# Patient Record
Sex: FEMALE | Race: WHITE | NOT HISPANIC OR LATINO | URBAN - METROPOLITAN AREA
[De-identification: names, ages, dates, MRNs, and addresses within clinical notes are randomized per-mention and may not be internally consistent; named-entity substitution may affect disease eponyms.]

---

## 2017-01-01 DIAGNOSIS — Z90.2 ACQUIRED ABSENCE OF LUNG [PART OF]: ICD-10-CM

## 2017-01-01 DIAGNOSIS — Z79.01 LONG TERM (CURRENT) USE OF ANTICOAGULANTS: ICD-10-CM

## 2017-01-01 DIAGNOSIS — N39.0 URINARY TRACT INFECTION, SITE NOT SPECIFIED: ICD-10-CM

## 2017-01-01 DIAGNOSIS — Z85.118 PERSONAL HISTORY OF OTHER MALIGNANT NEOPLASM OF BRONCHUS AND LUNG: ICD-10-CM

## 2017-01-01 DIAGNOSIS — Z87.891 PERSONAL HISTORY OF NICOTINE DEPENDENCE: ICD-10-CM

## 2017-01-01 DIAGNOSIS — Z99.81 DEPENDENCE ON SUPPLEMENTAL OXYGEN: ICD-10-CM

## 2017-01-01 DIAGNOSIS — J44.1 CHRONIC OBSTRUCTIVE PULMONARY DISEASE WITH (ACUTE) EXACERBATION: ICD-10-CM

## 2017-01-01 DIAGNOSIS — E03.9 HYPOTHYROIDISM, UNSPECIFIED: ICD-10-CM

## 2017-01-01 DIAGNOSIS — R06.02 SHORTNESS OF BREATH: ICD-10-CM

## 2017-01-01 DIAGNOSIS — J44.0 CHRONIC OBSTRUCTIVE PULMONARY DISEASE WITH (ACUTE) LOWER RESPIRATORY INFECTION: ICD-10-CM

## 2017-01-01 DIAGNOSIS — J96.22 ACUTE AND CHRONIC RESPIRATORY FAILURE WITH HYPERCAPNIA: ICD-10-CM

## 2017-01-01 DIAGNOSIS — J20.9 ACUTE BRONCHITIS, UNSPECIFIED: ICD-10-CM

## 2017-01-01 DIAGNOSIS — Z96.0 PRESENCE OF UROGENITAL IMPLANTS: ICD-10-CM
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
## 2017-01-01 DIAGNOSIS — J96.21 ACUTE AND CHRONIC RESPIRATORY FAILURE WITH HYPOXIA: ICD-10-CM

## 2017-01-01 DIAGNOSIS — Z86.718 PERSONAL HISTORY OF OTHER VENOUS THROMBOSIS AND EMBOLISM: ICD-10-CM

## 2017-01-01 DIAGNOSIS — T38.0X5A ADVERSE EFFECT OF GLUCOCORTICOIDS AND SYNTHETIC ANALOGUES, INITIAL ENCOUNTER: ICD-10-CM

## 2017-01-01 DIAGNOSIS — E09.9 DRUG OR CHEMICAL INDUCED DIABETES MELLITUS WITHOUT COMPLICATIONS: ICD-10-CM

## 2017-04-19 ENCOUNTER — INPATIENT (INPATIENT)
Facility: HOSPITAL | Age: 77
LOS: 13 days | Discharge: HOME | End: 2017-05-03
Attending: INTERNAL MEDICINE

## 2017-06-17 ENCOUNTER — OUTPATIENT (OUTPATIENT)
Dept: OUTPATIENT SERVICES | Facility: HOSPITAL | Age: 77
LOS: 1 days | Discharge: HOME | End: 2017-06-17

## 2017-06-17 DIAGNOSIS — J44.1 CHRONIC OBSTRUCTIVE PULMONARY DISEASE WITH (ACUTE) EXACERBATION: ICD-10-CM

## 2017-06-28 DIAGNOSIS — N39.0 URINARY TRACT INFECTION, SITE NOT SPECIFIED: ICD-10-CM

## 2018-01-01 ENCOUNTER — INPATIENT (INPATIENT)
Facility: HOSPITAL | Age: 78
LOS: 0 days | End: 2018-07-28
Attending: INTERNAL MEDICINE | Admitting: INTERNAL MEDICINE

## 2018-01-01 ENCOUNTER — INPATIENT (INPATIENT)
Facility: HOSPITAL | Age: 78
LOS: 0 days | Discharge: HOME HEALTH | End: 2018-01-19
Attending: INTERNAL MEDICINE | Admitting: INTERNAL MEDICINE

## 2018-01-01 ENCOUNTER — INPATIENT (INPATIENT)
Facility: HOSPITAL | Age: 78
LOS: 5 days | Discharge: HOME HEALTH | End: 2018-05-23
Attending: HOSPITALIST | Admitting: HOSPITALIST

## 2018-01-01 ENCOUNTER — INPATIENT (INPATIENT)
Facility: HOSPITAL | Age: 78
LOS: 2 days | Discharge: HOME HEALTH | End: 2018-06-14
Attending: INTERNAL MEDICINE | Admitting: INTERNAL MEDICINE

## 2018-01-01 ENCOUNTER — EMERGENCY (EMERGENCY)
Facility: HOSPITAL | Age: 78
LOS: 1 days | End: 2018-01-01
Attending: EMERGENCY MEDICINE

## 2018-01-01 VITALS
HEART RATE: 104 BPM | DIASTOLIC BLOOD PRESSURE: 67 MMHG | OXYGEN SATURATION: 84 % | RESPIRATION RATE: 24 BRPM | SYSTOLIC BLOOD PRESSURE: 144 MMHG

## 2018-01-01 VITALS
RESPIRATION RATE: 18 BRPM | DIASTOLIC BLOOD PRESSURE: 55 MMHG | TEMPERATURE: 97 F | OXYGEN SATURATION: 98 % | HEART RATE: 93 BPM | SYSTOLIC BLOOD PRESSURE: 94 MMHG

## 2018-01-01 VITALS
SYSTOLIC BLOOD PRESSURE: 119 MMHG | TEMPERATURE: 97 F | HEART RATE: 74 BPM | DIASTOLIC BLOOD PRESSURE: 57 MMHG | OXYGEN SATURATION: 97 % | RESPIRATION RATE: 20 BRPM

## 2018-01-01 VITALS — OXYGEN SATURATION: 100 % | HEART RATE: 99 BPM

## 2018-01-01 VITALS
HEART RATE: 100 BPM | DIASTOLIC BLOOD PRESSURE: 59 MMHG | RESPIRATION RATE: 18 BRPM | SYSTOLIC BLOOD PRESSURE: 98 MMHG | TEMPERATURE: 96 F

## 2018-01-01 VITALS
DIASTOLIC BLOOD PRESSURE: 57 MMHG | HEART RATE: 78 BPM | RESPIRATION RATE: 18 BRPM | SYSTOLIC BLOOD PRESSURE: 119 MMHG | TEMPERATURE: 97 F

## 2018-01-01 VITALS — OXYGEN SATURATION: 79 % | HEART RATE: 87 BPM

## 2018-01-01 DIAGNOSIS — I46.9 CARDIAC ARREST, CAUSE UNSPECIFIED: ICD-10-CM

## 2018-01-01 DIAGNOSIS — Z99.81 DEPENDENCE ON SUPPLEMENTAL OXYGEN: ICD-10-CM

## 2018-01-01 DIAGNOSIS — Z91.14 PATIENT'S OTHER NONCOMPLIANCE WITH MEDICATION REGIMEN: ICD-10-CM

## 2018-01-01 DIAGNOSIS — G47.33 OBSTRUCTIVE SLEEP APNEA (ADULT) (PEDIATRIC): ICD-10-CM

## 2018-01-01 DIAGNOSIS — E03.9 HYPOTHYROIDISM, UNSPECIFIED: ICD-10-CM

## 2018-01-01 DIAGNOSIS — Z85.118 PERSONAL HISTORY OF OTHER MALIGNANT NEOPLASM OF BRONCHUS AND LUNG: ICD-10-CM

## 2018-01-01 DIAGNOSIS — Z98.890 OTHER SPECIFIED POSTPROCEDURAL STATES: Chronic | ICD-10-CM

## 2018-01-01 DIAGNOSIS — J98.11 ATELECTASIS: ICD-10-CM

## 2018-01-01 DIAGNOSIS — J96.22 ACUTE AND CHRONIC RESPIRATORY FAILURE WITH HYPERCAPNIA: ICD-10-CM

## 2018-01-01 DIAGNOSIS — Z79.01 LONG TERM (CURRENT) USE OF ANTICOAGULANTS: ICD-10-CM

## 2018-01-01 DIAGNOSIS — J44.1 CHRONIC OBSTRUCTIVE PULMONARY DISEASE WITH (ACUTE) EXACERBATION: ICD-10-CM

## 2018-01-01 DIAGNOSIS — Z66 DO NOT RESUSCITATE: ICD-10-CM

## 2018-01-01 DIAGNOSIS — R33.9 RETENTION OF URINE, UNSPECIFIED: ICD-10-CM

## 2018-01-01 DIAGNOSIS — F17.210 NICOTINE DEPENDENCE, CIGARETTES, UNCOMPLICATED: ICD-10-CM

## 2018-01-01 DIAGNOSIS — D64.9 ANEMIA, UNSPECIFIED: ICD-10-CM

## 2018-01-01 DIAGNOSIS — E87.1 HYPO-OSMOLALITY AND HYPONATREMIA: ICD-10-CM

## 2018-01-01 DIAGNOSIS — Z51.5 ENCOUNTER FOR PALLIATIVE CARE: ICD-10-CM

## 2018-01-01 DIAGNOSIS — Z86.718 PERSONAL HISTORY OF OTHER VENOUS THROMBOSIS AND EMBOLISM: ICD-10-CM

## 2018-01-01 DIAGNOSIS — N30.00 ACUTE CYSTITIS WITHOUT HEMATURIA: ICD-10-CM

## 2018-01-01 DIAGNOSIS — Z90.2 ACQUIRED ABSENCE OF LUNG [PART OF]: Chronic | ICD-10-CM

## 2018-01-01 DIAGNOSIS — Z87.891 PERSONAL HISTORY OF NICOTINE DEPENDENCE: ICD-10-CM

## 2018-01-01 DIAGNOSIS — J44.9 CHRONIC OBSTRUCTIVE PULMONARY DISEASE, UNSPECIFIED: ICD-10-CM

## 2018-01-01 DIAGNOSIS — T83.511A INFECTION AND INFLAMMATORY REACTION DUE TO INDWELLING URETHRAL CATHETER, INITIAL ENCOUNTER: ICD-10-CM

## 2018-01-01 DIAGNOSIS — Z88.1 ALLERGY STATUS TO OTHER ANTIBIOTIC AGENTS STATUS: ICD-10-CM

## 2018-01-01 DIAGNOSIS — R73.9 HYPERGLYCEMIA, UNSPECIFIED: ICD-10-CM

## 2018-01-01 DIAGNOSIS — Z90.2 ACQUIRED ABSENCE OF LUNG [PART OF]: ICD-10-CM

## 2018-01-01 DIAGNOSIS — J96.21 ACUTE AND CHRONIC RESPIRATORY FAILURE WITH HYPOXIA: ICD-10-CM

## 2018-01-01 DIAGNOSIS — R06.02 SHORTNESS OF BREATH: ICD-10-CM

## 2018-01-01 DIAGNOSIS — C34.90 MALIGNANT NEOPLASM OF UNSPECIFIED PART OF UNSPECIFIED BRONCHUS OR LUNG: ICD-10-CM

## 2018-01-01 DIAGNOSIS — Z99.3 DEPENDENCE ON WHEELCHAIR: ICD-10-CM

## 2018-01-01 DIAGNOSIS — Z79.4 LONG TERM (CURRENT) USE OF INSULIN: ICD-10-CM

## 2018-01-01 DIAGNOSIS — E61.1 IRON DEFICIENCY: ICD-10-CM
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
## 2018-01-01 DIAGNOSIS — E03.8 OTHER SPECIFIED HYPOTHYROIDISM: ICD-10-CM

## 2018-01-01 LAB
-  AMIKACIN: SIGNIFICANT CHANGE UP
-  AZTREONAM: SIGNIFICANT CHANGE UP
-  CEFEPIME: SIGNIFICANT CHANGE UP
-  CEFTAZIDIME: SIGNIFICANT CHANGE UP
-  CIPROFLOXACIN: SIGNIFICANT CHANGE UP
-  GENTAMICIN: SIGNIFICANT CHANGE UP
-  IMIPENEM: SIGNIFICANT CHANGE UP
-  LEVOFLOXACIN: SIGNIFICANT CHANGE UP
-  MEROPENEM: SIGNIFICANT CHANGE UP
-  PIPERACILLIN/TAZOBACTAM: SIGNIFICANT CHANGE UP
-  TOBRAMYCIN: SIGNIFICANT CHANGE UP
ALBUMIN SERPL ELPH-MCNC: 3.6 G/DL — SIGNIFICANT CHANGE UP (ref 3.5–5.2)
ALBUMIN SERPL ELPH-MCNC: 3.7 G/DL — SIGNIFICANT CHANGE UP (ref 3.5–5.2)
ALBUMIN SERPL ELPH-MCNC: 3.8 G/DL — SIGNIFICANT CHANGE UP (ref 3.5–5.2)
ALBUMIN SERPL ELPH-MCNC: 3.8 G/DL — SIGNIFICANT CHANGE UP (ref 3.5–5.2)
ALP SERPL-CCNC: 113 U/L — SIGNIFICANT CHANGE UP (ref 30–115)
ALP SERPL-CCNC: 120 U/L — HIGH (ref 30–115)
ALP SERPL-CCNC: 126 U/L — HIGH (ref 30–115)
ALP SERPL-CCNC: 191 U/L — HIGH (ref 30–115)
ALT FLD-CCNC: 12 U/L — SIGNIFICANT CHANGE UP (ref 0–41)
ALT FLD-CCNC: 15 U/L — SIGNIFICANT CHANGE UP (ref 0–41)
ALT FLD-CCNC: 20 U/L — SIGNIFICANT CHANGE UP (ref 0–41)
ALT FLD-CCNC: 20 U/L — SIGNIFICANT CHANGE UP (ref 0–41)
ANION GAP SERPL CALC-SCNC: 13 MMOL/L — SIGNIFICANT CHANGE UP (ref 7–14)
ANION GAP SERPL CALC-SCNC: 14 MMOL/L — SIGNIFICANT CHANGE UP (ref 7–14)
ANION GAP SERPL CALC-SCNC: 14 MMOL/L — SIGNIFICANT CHANGE UP (ref 7–14)
ANION GAP SERPL CALC-SCNC: 17 MMOL/L — HIGH (ref 7–14)
ANION GAP SERPL CALC-SCNC: 5 MMOL/L — LOW (ref 7–14)
ANION GAP SERPL CALC-SCNC: 6 MMOL/L — LOW (ref 7–14)
ANION GAP SERPL CALC-SCNC: 6 MMOL/L — LOW (ref 7–14)
ANION GAP SERPL CALC-SCNC: 8 MMOL/L — SIGNIFICANT CHANGE UP (ref 7–14)
ANION GAP SERPL CALC-SCNC: 9 MMOL/L — SIGNIFICANT CHANGE UP (ref 7–14)
APPEARANCE UR: (no result)
APPEARANCE UR: CLEAR — SIGNIFICANT CHANGE UP
APPEARANCE UR: CLEAR — SIGNIFICANT CHANGE UP
APTT BLD: 32.8 SEC — SIGNIFICANT CHANGE UP (ref 27–39.2)
APTT BLD: 33.2 SEC — SIGNIFICANT CHANGE UP (ref 27–39.2)
APTT BLD: 49.4 SEC — HIGH (ref 27–39.2)
AST SERPL-CCNC: 14 U/L — SIGNIFICANT CHANGE UP (ref 0–41)
AST SERPL-CCNC: 18 U/L — SIGNIFICANT CHANGE UP (ref 0–41)
AST SERPL-CCNC: 19 U/L — SIGNIFICANT CHANGE UP (ref 0–41)
AST SERPL-CCNC: 20 U/L — SIGNIFICANT CHANGE UP (ref 0–41)
BACTERIA # UR AUTO: (no result) /HPF
BASE EXCESS BLDA CALC-SCNC: 22.4 MMOL/L — HIGH (ref -2–2)
BASE EXCESS BLDCOV CALC-SCNC: 21.2 MMOL/L — HIGH (ref -5.3–0.5)
BASE EXCESS BLDV CALC-SCNC: 23.7 MMOL/L — HIGH (ref -2–2)
BASOPHILS # BLD AUTO: 0 K/UL — SIGNIFICANT CHANGE UP (ref 0–0.2)
BASOPHILS # BLD AUTO: 0.01 K/UL — SIGNIFICANT CHANGE UP (ref 0–0.2)
BASOPHILS # BLD AUTO: 0.03 K/UL — SIGNIFICANT CHANGE UP (ref 0–0.2)
BASOPHILS # BLD AUTO: 0.04 K/UL — SIGNIFICANT CHANGE UP (ref 0–0.2)
BASOPHILS NFR BLD AUTO: 0 % — SIGNIFICANT CHANGE UP (ref 0–1)
BASOPHILS NFR BLD AUTO: 0.1 % — SIGNIFICANT CHANGE UP (ref 0–1)
BASOPHILS NFR BLD AUTO: 0.3 % — SIGNIFICANT CHANGE UP (ref 0–1)
BASOPHILS NFR BLD AUTO: 0.4 % — SIGNIFICANT CHANGE UP (ref 0–1)
BILIRUB DIRECT SERPL-MCNC: <0.2 MG/DL — SIGNIFICANT CHANGE UP (ref 0–0.2)
BILIRUB INDIRECT FLD-MCNC: SIGNIFICANT CHANGE UP MG/DL (ref 0.2–1.2)
BILIRUB SERPL-MCNC: 0.3 MG/DL — SIGNIFICANT CHANGE UP (ref 0.2–1.2)
BILIRUB SERPL-MCNC: <0.2 MG/DL — SIGNIFICANT CHANGE UP (ref 0.2–1.2)
BILIRUB UR-MCNC: NEGATIVE — SIGNIFICANT CHANGE UP
BUN SERPL-MCNC: 13 MG/DL — SIGNIFICANT CHANGE UP (ref 10–20)
BUN SERPL-MCNC: 22 MG/DL — HIGH (ref 10–20)
BUN SERPL-MCNC: 22 MG/DL — HIGH (ref 10–20)
BUN SERPL-MCNC: 23 MG/DL — HIGH (ref 10–20)
BUN SERPL-MCNC: 25 MG/DL — HIGH (ref 10–20)
BUN SERPL-MCNC: 29 MG/DL — HIGH (ref 10–20)
CALCIUM SERPL-MCNC: 8.3 MG/DL — LOW (ref 8.5–10.1)
CALCIUM SERPL-MCNC: 8.4 MG/DL — LOW (ref 8.5–10.1)
CALCIUM SERPL-MCNC: 8.5 MG/DL — SIGNIFICANT CHANGE UP (ref 8.5–10.1)
CALCIUM SERPL-MCNC: 8.6 MG/DL — SIGNIFICANT CHANGE UP (ref 8.5–10.1)
CALCIUM SERPL-MCNC: 8.7 MG/DL — SIGNIFICANT CHANGE UP (ref 8.5–10.1)
CALCIUM SERPL-MCNC: 8.7 MG/DL — SIGNIFICANT CHANGE UP (ref 8.5–10.1)
CALCIUM SERPL-MCNC: 8.9 MG/DL — SIGNIFICANT CHANGE UP (ref 8.5–10.1)
CHLORIDE SERPL-SCNC: 77 MMOL/L — LOW (ref 98–110)
CHLORIDE SERPL-SCNC: 82 MMOL/L — LOW (ref 98–110)
CHLORIDE SERPL-SCNC: 83 MMOL/L — LOW (ref 98–110)
CHLORIDE SERPL-SCNC: 83 MMOL/L — LOW (ref 98–110)
CHLORIDE SERPL-SCNC: 84 MMOL/L — LOW (ref 98–110)
CHLORIDE SERPL-SCNC: 85 MMOL/L — LOW (ref 98–110)
CHLORIDE SERPL-SCNC: 89 MMOL/L — LOW (ref 98–110)
CHLORIDE SERPL-SCNC: 92 MMOL/L — LOW (ref 98–110)
CHLORIDE SERPL-SCNC: 94 MMOL/L — LOW (ref 98–110)
CK MB CFR SERPL CALC: 1.6 NG/ML — SIGNIFICANT CHANGE UP (ref 0.6–6.3)
CK SERPL-CCNC: 33 U/L — SIGNIFICANT CHANGE UP (ref 0–225)
CO2 SERPL-SCNC: 35 MMOL/L — HIGH (ref 17–32)
CO2 SERPL-SCNC: 36 MMOL/L — HIGH (ref 17–32)
CO2 SERPL-SCNC: 38 MMOL/L — HIGH (ref 17–32)
CO2 SERPL-SCNC: 39 MMOL/L — HIGH (ref 17–32)
CO2 SERPL-SCNC: 39 MMOL/L — HIGH (ref 17–32)
CO2 SERPL-SCNC: 42 MMOL/L — CRITICAL HIGH (ref 17–32)
CO2 SERPL-SCNC: 48 MMOL/L — CRITICAL HIGH (ref 17–32)
CO2 SERPL-SCNC: 49 MMOL/L — CRITICAL HIGH (ref 17–32)
CO2 SERPL-SCNC: 49 MMOL/L — CRITICAL HIGH (ref 17–32)
COLOR SPEC: YELLOW — SIGNIFICANT CHANGE UP
COMMENT - URINE: SIGNIFICANT CHANGE UP
CREAT SERPL-MCNC: 0.8 MG/DL — SIGNIFICANT CHANGE UP (ref 0.7–1.5)
CREAT SERPL-MCNC: 0.8 MG/DL — SIGNIFICANT CHANGE UP (ref 0.7–1.5)
CREAT SERPL-MCNC: 0.9 MG/DL — SIGNIFICANT CHANGE UP (ref 0.7–1.5)
CREAT SERPL-MCNC: 1 MG/DL — SIGNIFICANT CHANGE UP (ref 0.7–1.5)
CREAT SERPL-MCNC: 1 MG/DL — SIGNIFICANT CHANGE UP (ref 0.7–1.5)
CREAT SERPL-MCNC: 1.1 MG/DL — SIGNIFICANT CHANGE UP (ref 0.7–1.5)
CREAT SERPL-MCNC: 1.2 MG/DL — SIGNIFICANT CHANGE UP (ref 0.7–1.5)
CULTURE RESULTS: SIGNIFICANT CHANGE UP
DIFF PNL FLD: NEGATIVE — SIGNIFICANT CHANGE UP
EOSINOPHIL # BLD AUTO: 0 K/UL — SIGNIFICANT CHANGE UP (ref 0–0.7)
EOSINOPHIL # BLD AUTO: 0.01 K/UL — SIGNIFICANT CHANGE UP (ref 0–0.7)
EOSINOPHIL # BLD AUTO: 0.24 K/UL — SIGNIFICANT CHANGE UP (ref 0–0.7)
EOSINOPHIL # BLD AUTO: 0.48 K/UL — SIGNIFICANT CHANGE UP (ref 0–0.7)
EOSINOPHIL NFR BLD AUTO: 0 % — SIGNIFICANT CHANGE UP (ref 0–8)
EOSINOPHIL NFR BLD AUTO: 0.1 % — SIGNIFICANT CHANGE UP (ref 0–8)
EOSINOPHIL NFR BLD AUTO: 2.5 % — SIGNIFICANT CHANGE UP (ref 0–8)
EOSINOPHIL NFR BLD AUTO: 5.2 % — SIGNIFICANT CHANGE UP (ref 0–8)
EPI CELLS # UR: (no result) /HPF
GAS PNL BLDA: SIGNIFICANT CHANGE UP
GAS PNL BLDCOV: 7.26 — SIGNIFICANT CHANGE UP (ref 7.26–7.38)
GLUCOSE SERPL-MCNC: 140 MG/DL — HIGH (ref 70–99)
GLUCOSE SERPL-MCNC: 149 MG/DL — HIGH (ref 70–99)
GLUCOSE SERPL-MCNC: 151 MG/DL — HIGH (ref 70–99)
GLUCOSE SERPL-MCNC: 159 MG/DL — HIGH (ref 70–99)
GLUCOSE SERPL-MCNC: 180 MG/DL — HIGH (ref 70–99)
GLUCOSE SERPL-MCNC: 227 MG/DL — HIGH (ref 70–99)
GLUCOSE SERPL-MCNC: 255 MG/DL — HIGH (ref 70–99)
GLUCOSE SERPL-MCNC: 336 MG/DL — HIGH (ref 70–99)
GLUCOSE SERPL-MCNC: 84 MG/DL — SIGNIFICANT CHANGE UP (ref 70–99)
GLUCOSE UR QL: NEGATIVE MG/DL — SIGNIFICANT CHANGE UP
HCO3 BLDA-SCNC: 52 MMOL/L — CRITICAL HIGH (ref 23–27)
HCO3 BLDCOV-SCNC: 53.6 MMOL/L — HIGH (ref 20.5–24.7)
HCO3 BLDV-SCNC: 54 MMOL/L — HIGH (ref 22–29)
HCT VFR BLD CALC: 29.2 % — LOW (ref 37–47)
HCT VFR BLD CALC: 29.9 % — LOW (ref 37–47)
HCT VFR BLD CALC: 30 % — LOW (ref 37–47)
HCT VFR BLD CALC: 30.2 % — LOW (ref 37–47)
HCT VFR BLD CALC: 32.9 % — LOW (ref 37–47)
HCT VFR BLD CALC: 33.3 % — LOW (ref 37–47)
HCT VFR BLD CALC: 34.5 % — LOW (ref 37–47)
HCT VFR BLD CALC: 35 % — LOW (ref 37–47)
HGB BLD-MCNC: 10.8 G/DL — LOW (ref 12–16)
HGB BLD-MCNC: 10.9 G/DL — LOW (ref 12–16)
HGB BLD-MCNC: 11 G/DL — LOW (ref 12–16)
HGB BLD-MCNC: 11 G/DL — LOW (ref 12–16)
HGB BLD-MCNC: 9.5 G/DL — LOW (ref 12–16)
HGB BLD-MCNC: 9.6 G/DL — LOW (ref 12–16)
HGB BLD-MCNC: 9.8 G/DL — LOW (ref 12–16)
HGB BLD-MCNC: 9.9 G/DL — LOW (ref 12–16)
HOROWITZ INDEX BLDA+IHG-RTO: 40 — SIGNIFICANT CHANGE UP
IMM GRANULOCYTES NFR BLD AUTO: 0.8 % — HIGH (ref 0.1–0.3)
IMM GRANULOCYTES NFR BLD AUTO: 0.9 % — HIGH (ref 0.1–0.3)
IMM GRANULOCYTES NFR BLD AUTO: 1.1 % — HIGH (ref 0.1–0.3)
IMM GRANULOCYTES NFR BLD AUTO: 1.1 % — HIGH (ref 0.1–0.3)
INR BLD: 1.31 RATIO — HIGH (ref 0.65–1.3)
INR BLD: 1.59 RATIO — HIGH (ref 0.65–1.3)
KETONES UR-MCNC: NEGATIVE — SIGNIFICANT CHANGE UP
LACTATE SERPL-SCNC: 1.4 MMOL/L — SIGNIFICANT CHANGE UP (ref 0.5–2.2)
LEUKOCYTE ESTERASE UR-ACNC: (no result)
LYMPHOCYTES # BLD AUTO: 0.3 K/UL — LOW (ref 1.2–3.4)
LYMPHOCYTES # BLD AUTO: 0.72 K/UL — LOW (ref 1.2–3.4)
LYMPHOCYTES # BLD AUTO: 1.08 K/UL — LOW (ref 1.2–3.4)
LYMPHOCYTES # BLD AUTO: 1.35 K/UL — SIGNIFICANT CHANGE UP (ref 1.2–3.4)
LYMPHOCYTES # BLD AUTO: 1.49 K/UL — SIGNIFICANT CHANGE UP (ref 1.2–3.4)
LYMPHOCYTES # BLD AUTO: 1.62 K/UL — SIGNIFICANT CHANGE UP (ref 1.2–3.4)
LYMPHOCYTES # BLD AUTO: 12.1 % — LOW (ref 20.5–51.1)
LYMPHOCYTES # BLD AUTO: 14 % — LOW (ref 20.5–51.1)
LYMPHOCYTES # BLD AUTO: 14.6 % — LOW (ref 20.5–51.1)
LYMPHOCYTES # BLD AUTO: 17.2 % — LOW (ref 20.5–51.1)
LYMPHOCYTES # BLD AUTO: 2.5 % — LOW (ref 20.5–51.1)
LYMPHOCYTES # BLD AUTO: 8.5 % — LOW (ref 20.5–51.1)
MAGNESIUM SERPL-MCNC: 1.4 MG/DL — LOW (ref 1.8–2.4)
MAGNESIUM SERPL-MCNC: 1.5 MG/DL — LOW (ref 1.8–2.4)
MAGNESIUM SERPL-MCNC: 1.5 MG/DL — LOW (ref 1.8–2.4)
MAGNESIUM SERPL-MCNC: 2.1 MG/DL — SIGNIFICANT CHANGE UP (ref 1.8–2.4)
MAGNESIUM SERPL-MCNC: 2.1 MG/DL — SIGNIFICANT CHANGE UP (ref 1.8–2.4)
MAGNESIUM SERPL-MCNC: 2.2 MG/DL — SIGNIFICANT CHANGE UP (ref 1.8–2.4)
MCHC RBC-ENTMCNC: 31.4 G/DL — LOW (ref 32–37)
MCHC RBC-ENTMCNC: 31.5 PG — HIGH (ref 27–31)
MCHC RBC-ENTMCNC: 31.6 PG — HIGH (ref 27–31)
MCHC RBC-ENTMCNC: 31.6 PG — HIGH (ref 27–31)
MCHC RBC-ENTMCNC: 31.7 G/DL — LOW (ref 32–37)
MCHC RBC-ENTMCNC: 31.8 G/DL — LOW (ref 32–37)
MCHC RBC-ENTMCNC: 31.8 PG — HIGH (ref 27–31)
MCHC RBC-ENTMCNC: 31.9 G/DL — LOW (ref 32–37)
MCHC RBC-ENTMCNC: 31.9 PG — HIGH (ref 27–31)
MCHC RBC-ENTMCNC: 31.9 PG — HIGH (ref 27–31)
MCHC RBC-ENTMCNC: 32.2 PG — HIGH (ref 27–31)
MCHC RBC-ENTMCNC: 32.7 G/DL — SIGNIFICANT CHANGE UP (ref 32–37)
MCHC RBC-ENTMCNC: 32.7 PG — HIGH (ref 27–31)
MCHC RBC-ENTMCNC: 32.8 G/DL — SIGNIFICANT CHANGE UP (ref 32–37)
MCHC RBC-ENTMCNC: 33.1 G/DL — SIGNIFICANT CHANGE UP (ref 32–37)
MCHC RBC-ENTMCNC: 33.6 G/DL — SIGNIFICANT CHANGE UP (ref 32–37)
MCV RBC AUTO: 100 FL — HIGH (ref 81–99)
MCV RBC AUTO: 100.6 FL — HIGH (ref 81–99)
MCV RBC AUTO: 95.1 FL — SIGNIFICANT CHANGE UP (ref 81–99)
MCV RBC AUTO: 96.1 FL — SIGNIFICANT CHANGE UP (ref 81–99)
MCV RBC AUTO: 98.5 FL — SIGNIFICANT CHANGE UP (ref 81–99)
MCV RBC AUTO: 99.3 FL — HIGH (ref 81–99)
MCV RBC AUTO: 99.3 FL — HIGH (ref 81–99)
MCV RBC AUTO: 99.7 FL — HIGH (ref 81–99)
METHOD TYPE: SIGNIFICANT CHANGE UP
MONOCYTES # BLD AUTO: 0.29 K/UL — SIGNIFICANT CHANGE UP (ref 0.1–0.6)
MONOCYTES # BLD AUTO: 0.56 K/UL — SIGNIFICANT CHANGE UP (ref 0.1–0.6)
MONOCYTES # BLD AUTO: 0.64 K/UL — HIGH (ref 0.1–0.6)
MONOCYTES # BLD AUTO: 0.79 K/UL — HIGH (ref 0.1–0.6)
MONOCYTES # BLD AUTO: 0.84 K/UL — HIGH (ref 0.1–0.6)
MONOCYTES # BLD AUTO: 0.92 K/UL — HIGH (ref 0.1–0.6)
MONOCYTES NFR BLD AUTO: 10.9 % — HIGH (ref 1.7–9.3)
MONOCYTES NFR BLD AUTO: 2.4 % — SIGNIFICANT CHANGE UP (ref 1.7–9.3)
MONOCYTES NFR BLD AUTO: 6.6 % — SIGNIFICANT CHANGE UP (ref 1.7–9.3)
MONOCYTES NFR BLD AUTO: 6.9 % — SIGNIFICANT CHANGE UP (ref 1.7–9.3)
MONOCYTES NFR BLD AUTO: 7.5 % — SIGNIFICANT CHANGE UP (ref 1.7–9.3)
MONOCYTES NFR BLD AUTO: 8.4 % — SIGNIFICANT CHANGE UP (ref 1.7–9.3)
NEUTROPHILS # BLD AUTO: 11.48 K/UL — HIGH (ref 1.4–6.5)
NEUTROPHILS # BLD AUTO: 5.73 K/UL — SIGNIFICANT CHANGE UP (ref 1.4–6.5)
NEUTROPHILS # BLD AUTO: 6.64 K/UL — HIGH (ref 1.4–6.5)
NEUTROPHILS # BLD AUTO: 6.67 K/UL — HIGH (ref 1.4–6.5)
NEUTROPHILS # BLD AUTO: 7.11 K/UL — HIGH (ref 1.4–6.5)
NEUTROPHILS # BLD AUTO: 9.77 K/UL — HIGH (ref 1.4–6.5)
NEUTROPHILS NFR BLD AUTO: 70.4 % — SIGNIFICANT CHANGE UP (ref 42.2–75.2)
NEUTROPHILS NFR BLD AUTO: 72.2 % — SIGNIFICANT CHANGE UP (ref 42.2–75.2)
NEUTROPHILS NFR BLD AUTO: 74.3 % — SIGNIFICANT CHANGE UP (ref 42.2–75.2)
NEUTROPHILS NFR BLD AUTO: 79.3 % — HIGH (ref 42.2–75.2)
NEUTROPHILS NFR BLD AUTO: 83.7 % — HIGH (ref 42.2–75.2)
NEUTROPHILS NFR BLD AUTO: 94.2 % — HIGH (ref 42.2–75.2)
NITRITE UR-MCNC: NEGATIVE — SIGNIFICANT CHANGE UP
NITRITE UR-MCNC: NEGATIVE — SIGNIFICANT CHANGE UP
NITRITE UR-MCNC: POSITIVE
NRBC # BLD: 0 /100 WBCS — SIGNIFICANT CHANGE UP (ref 0–0)
NT-PROBNP SERPL-SCNC: 194 PG/ML — SIGNIFICANT CHANGE UP (ref 0–300)
NT-PROBNP SERPL-SCNC: 240 PG/ML — SIGNIFICANT CHANGE UP (ref 0–300)
NT-PROBNP SERPL-SCNC: 329 PG/ML — HIGH (ref 0–300)
ORGANISM # SPEC MICROSCOPIC CNT: SIGNIFICANT CHANGE UP
PCO2 BLDA: 89 MMHG — CRITICAL HIGH (ref 38–42)
PCO2 BLDCOV: 120 MMHG — HIGH (ref 37.1–50.5)
PCO2 BLDV: 95 MMHG — HIGH (ref 41–51)
PH BLDA: 7.38 — SIGNIFICANT CHANGE UP (ref 7.38–7.42)
PH BLDV: 7.36 — SIGNIFICANT CHANGE UP (ref 7.26–7.43)
PH UR: 7 — SIGNIFICANT CHANGE UP (ref 5–8)
PH UR: 7 — SIGNIFICANT CHANGE UP (ref 5–8)
PH UR: 7.5 — SIGNIFICANT CHANGE UP (ref 5–8)
PHOSPHATE SERPL-MCNC: 3.3 MG/DL — SIGNIFICANT CHANGE UP (ref 2.1–4.9)
PLATELET # BLD AUTO: 296 K/UL — SIGNIFICANT CHANGE UP (ref 130–400)
PLATELET # BLD AUTO: 308 K/UL — SIGNIFICANT CHANGE UP (ref 130–400)
PLATELET # BLD AUTO: 314 K/UL — SIGNIFICANT CHANGE UP (ref 130–400)
PLATELET # BLD AUTO: 315 K/UL — SIGNIFICANT CHANGE UP (ref 130–400)
PLATELET # BLD AUTO: 322 K/UL — SIGNIFICANT CHANGE UP (ref 130–400)
PLATELET # BLD AUTO: 325 K/UL — SIGNIFICANT CHANGE UP (ref 130–400)
PLATELET # BLD AUTO: 338 K/UL — SIGNIFICANT CHANGE UP (ref 130–400)
PLATELET # BLD AUTO: 399 K/UL — SIGNIFICANT CHANGE UP (ref 130–400)
PO2 BLDA: 86 MMHG — SIGNIFICANT CHANGE UP (ref 78–95)
PO2 BLDCOA: 38.6 MMHG — HIGH (ref 21.4–36)
PO2 BLDV: 52 MMHG — HIGH (ref 20–40)
POTASSIUM SERPL-MCNC: 3.2 MMOL/L — LOW (ref 3.5–5)
POTASSIUM SERPL-MCNC: 3.6 MMOL/L — SIGNIFICANT CHANGE UP (ref 3.5–5)
POTASSIUM SERPL-MCNC: 3.7 MMOL/L — SIGNIFICANT CHANGE UP (ref 3.5–5)
POTASSIUM SERPL-MCNC: 3.8 MMOL/L — SIGNIFICANT CHANGE UP (ref 3.5–5)
POTASSIUM SERPL-MCNC: 4 MMOL/L — SIGNIFICANT CHANGE UP (ref 3.5–5)
POTASSIUM SERPL-MCNC: 4.3 MMOL/L — SIGNIFICANT CHANGE UP (ref 3.5–5)
POTASSIUM SERPL-MCNC: 4.4 MMOL/L — SIGNIFICANT CHANGE UP (ref 3.5–5)
POTASSIUM SERPL-MCNC: 4.5 MMOL/L — SIGNIFICANT CHANGE UP (ref 3.5–5)
POTASSIUM SERPL-MCNC: 4.6 MMOL/L — SIGNIFICANT CHANGE UP (ref 3.5–5)
POTASSIUM SERPL-SCNC: 3.2 MMOL/L — LOW (ref 3.5–5)
POTASSIUM SERPL-SCNC: 3.6 MMOL/L — SIGNIFICANT CHANGE UP (ref 3.5–5)
POTASSIUM SERPL-SCNC: 3.7 MMOL/L — SIGNIFICANT CHANGE UP (ref 3.5–5)
POTASSIUM SERPL-SCNC: 3.8 MMOL/L — SIGNIFICANT CHANGE UP (ref 3.5–5)
POTASSIUM SERPL-SCNC: 4 MMOL/L — SIGNIFICANT CHANGE UP (ref 3.5–5)
POTASSIUM SERPL-SCNC: 4.3 MMOL/L — SIGNIFICANT CHANGE UP (ref 3.5–5)
POTASSIUM SERPL-SCNC: 4.4 MMOL/L — SIGNIFICANT CHANGE UP (ref 3.5–5)
POTASSIUM SERPL-SCNC: 4.5 MMOL/L — SIGNIFICANT CHANGE UP (ref 3.5–5)
POTASSIUM SERPL-SCNC: 4.6 MMOL/L — SIGNIFICANT CHANGE UP (ref 3.5–5)
PROT SERPL-MCNC: 6.2 G/DL — SIGNIFICANT CHANGE UP (ref 6–8)
PROT SERPL-MCNC: 6.4 G/DL — SIGNIFICANT CHANGE UP (ref 6–8)
PROT SERPL-MCNC: 6.5 G/DL — SIGNIFICANT CHANGE UP (ref 6–8)
PROT SERPL-MCNC: 6.7 G/DL — SIGNIFICANT CHANGE UP (ref 6–8)
PROT UR-MCNC: NEGATIVE MG/DL — SIGNIFICANT CHANGE UP
PROTHROM AB SERPL-ACNC: 14.2 SEC — HIGH (ref 9.95–12.87)
PROTHROM AB SERPL-ACNC: 17.3 SEC — HIGH (ref 9.95–12.87)
RBC # BLD: 3.02 M/UL — LOW (ref 4.2–5.4)
RBC # BLD: 3.04 M/UL — LOW (ref 4.2–5.4)
RBC # BLD: 3.07 M/UL — LOW (ref 4.2–5.4)
RBC # BLD: 3.11 M/UL — LOW (ref 4.2–5.4)
RBC # BLD: 3.3 M/UL — LOW (ref 4.2–5.4)
RBC # BLD: 3.38 M/UL — LOW (ref 4.2–5.4)
RBC # BLD: 3.45 M/UL — LOW (ref 4.2–5.4)
RBC # BLD: 3.48 M/UL — LOW (ref 4.2–5.4)
RBC # FLD: 13 % — SIGNIFICANT CHANGE UP (ref 11.5–14.5)
RBC # FLD: 13.2 % — SIGNIFICANT CHANGE UP (ref 11.5–14.5)
RBC # FLD: 13.3 % — SIGNIFICANT CHANGE UP (ref 11.5–14.5)
RBC # FLD: 13.4 % — SIGNIFICANT CHANGE UP (ref 11.5–14.5)
RBC # FLD: 13.4 % — SIGNIFICANT CHANGE UP (ref 11.5–14.5)
RBC # FLD: 13.6 % — SIGNIFICANT CHANGE UP (ref 11.5–14.5)
RBC # FLD: 13.7 % — SIGNIFICANT CHANGE UP (ref 11.5–14.5)
RBC # FLD: 13.7 % — SIGNIFICANT CHANGE UP (ref 11.5–14.5)
SAO2 % BLDA: 97 % — SIGNIFICANT CHANGE UP (ref 94–98)
SAO2 % BLDCOV: 64 % — LOW (ref 94–98)
SAO2 % BLDV: 83 % — SIGNIFICANT CHANGE UP
SODIUM SERPL-SCNC: 133 MMOL/L — LOW (ref 135–146)
SODIUM SERPL-SCNC: 133 MMOL/L — LOW (ref 135–146)
SODIUM SERPL-SCNC: 137 MMOL/L — SIGNIFICANT CHANGE UP (ref 135–146)
SODIUM SERPL-SCNC: 137 MMOL/L — SIGNIFICANT CHANGE UP (ref 135–146)
SODIUM SERPL-SCNC: 138 MMOL/L — SIGNIFICANT CHANGE UP (ref 135–146)
SODIUM SERPL-SCNC: 138 MMOL/L — SIGNIFICANT CHANGE UP (ref 135–146)
SODIUM SERPL-SCNC: 139 MMOL/L — SIGNIFICANT CHANGE UP (ref 135–146)
SODIUM SERPL-SCNC: 139 MMOL/L — SIGNIFICANT CHANGE UP (ref 135–146)
SODIUM SERPL-SCNC: 142 MMOL/L — SIGNIFICANT CHANGE UP (ref 135–146)
SP GR SPEC: 1.01 — SIGNIFICANT CHANGE UP (ref 1.01–1.03)
SP GR SPEC: 1.01 — SIGNIFICANT CHANGE UP (ref 1.01–1.03)
SP GR SPEC: <=1.005 — SIGNIFICANT CHANGE UP (ref 1.01–1.03)
SPECIMEN SOURCE: SIGNIFICANT CHANGE UP
TROPONIN T SERPL-MCNC: <0.01 NG/ML — SIGNIFICANT CHANGE UP
TROPONIN T SERPL-MCNC: <0.01 NG/ML — SIGNIFICANT CHANGE UP
UROBILINOGEN FLD QL: 0.2 MG/DL — SIGNIFICANT CHANGE UP (ref 0.2–0.2)
UROBILINOGEN FLD QL: 1 MG/DL (ref 0.2–0.2)
UROBILINOGEN FLD QL: 1 MG/DL (ref 0.2–0.2)
WBC # BLD: 12.18 K/UL — HIGH (ref 4.8–10.8)
WBC # BLD: 12.31 K/UL — HIGH (ref 4.8–10.8)
WBC # BLD: 5.95 K/UL — SIGNIFICANT CHANGE UP (ref 4.8–10.8)
WBC # BLD: 7.71 K/UL — SIGNIFICANT CHANGE UP (ref 4.8–10.8)
WBC # BLD: 8.32 K/UL — SIGNIFICANT CHANGE UP (ref 4.8–10.8)
WBC # BLD: 8.49 K/UL — SIGNIFICANT CHANGE UP (ref 4.8–10.8)
WBC # BLD: 9.24 K/UL — SIGNIFICANT CHANGE UP (ref 4.8–10.8)
WBC # BLD: 9.43 K/UL — SIGNIFICANT CHANGE UP (ref 4.8–10.8)
WBC # FLD AUTO: 12.18 K/UL — HIGH (ref 4.8–10.8)
WBC # FLD AUTO: 12.31 K/UL — HIGH (ref 4.8–10.8)
WBC # FLD AUTO: 5.95 K/UL — SIGNIFICANT CHANGE UP (ref 4.8–10.8)
WBC # FLD AUTO: 7.71 K/UL — SIGNIFICANT CHANGE UP (ref 4.8–10.8)
WBC # FLD AUTO: 8.32 K/UL — SIGNIFICANT CHANGE UP (ref 4.8–10.8)
WBC # FLD AUTO: 8.49 K/UL — SIGNIFICANT CHANGE UP (ref 4.8–10.8)
WBC # FLD AUTO: 9.24 K/UL — SIGNIFICANT CHANGE UP (ref 4.8–10.8)
WBC # FLD AUTO: 9.43 K/UL — SIGNIFICANT CHANGE UP (ref 4.8–10.8)
WBC UR QL: (no result) /HPF

## 2018-01-01 RX ORDER — BUMETANIDE 0.25 MG/ML
3 INJECTION INTRAMUSCULAR; INTRAVENOUS
Qty: 0 | Refills: 0 | COMMUNITY
Start: 2018-01-01

## 2018-01-01 RX ORDER — DEXTROSE 50 % IN WATER 50 %
25 SYRINGE (ML) INTRAVENOUS ONCE
Qty: 0 | Refills: 0 | Status: DISCONTINUED | OUTPATIENT
Start: 2018-01-01 | End: 2018-01-01

## 2018-01-01 RX ORDER — NYSTATIN CREAM 100000 [USP'U]/G
1 CREAM TOPICAL
Qty: 15 | Refills: 0 | OUTPATIENT
Start: 2018-01-01

## 2018-01-01 RX ORDER — BUMETANIDE 0.25 MG/ML
3 INJECTION INTRAMUSCULAR; INTRAVENOUS
Qty: 90 | Refills: 0 | OUTPATIENT
Start: 2018-01-01 | End: 2018-01-01

## 2018-01-01 RX ORDER — BUMETANIDE 0.25 MG/ML
1 INJECTION INTRAMUSCULAR; INTRAVENOUS
Qty: 0 | Refills: 0 | COMMUNITY

## 2018-01-01 RX ORDER — SODIUM CHLORIDE 9 MG/ML
1000 INJECTION, SOLUTION INTRAVENOUS ONCE
Qty: 0 | Refills: 0 | Status: COMPLETED | OUTPATIENT
Start: 2018-01-01 | End: 2018-01-01

## 2018-01-01 RX ORDER — CEFTRIAXONE 500 MG/1
1 INJECTION, POWDER, FOR SOLUTION INTRAMUSCULAR; INTRAVENOUS EVERY 24 HOURS
Qty: 0 | Refills: 0 | Status: DISCONTINUED | OUTPATIENT
Start: 2018-01-01 | End: 2018-01-01

## 2018-01-01 RX ORDER — MORPHINE SULFATE 50 MG/1
1 CAPSULE, EXTENDED RELEASE ORAL
Qty: 100 | Refills: 0 | Status: DISCONTINUED | OUTPATIENT
Start: 2018-01-01 | End: 2018-01-01

## 2018-01-01 RX ORDER — BUMETANIDE 0.25 MG/ML
1 INJECTION INTRAMUSCULAR; INTRAVENOUS
Qty: 0 | Refills: 0 | Status: DISCONTINUED | OUTPATIENT
Start: 2018-01-01 | End: 2018-01-01

## 2018-01-01 RX ORDER — LEVOTHYROXINE SODIUM 125 MCG
1 TABLET ORAL
Qty: 0 | Refills: 0 | COMMUNITY

## 2018-01-01 RX ORDER — PANTOPRAZOLE SODIUM 20 MG/1
1 TABLET, DELAYED RELEASE ORAL
Qty: 0 | Refills: 0 | COMMUNITY

## 2018-01-01 RX ORDER — VANCOMYCIN HCL 1 G
1000 VIAL (EA) INTRAVENOUS ONCE
Qty: 0 | Refills: 0 | Status: COMPLETED | OUTPATIENT
Start: 2018-01-01 | End: 2018-01-01

## 2018-01-01 RX ORDER — FENTANYL CITRATE 50 UG/ML
0.5 INJECTION INTRAVENOUS
Qty: 2500 | Refills: 0 | Status: DISCONTINUED | OUTPATIENT
Start: 2018-01-01 | End: 2018-01-01

## 2018-01-01 RX ORDER — ALBUTEROL 90 UG/1
0 AEROSOL, METERED ORAL
Qty: 0 | Refills: 0 | COMMUNITY

## 2018-01-01 RX ORDER — LEVOTHYROXINE SODIUM 125 MCG
50 TABLET ORAL DAILY
Qty: 0 | Refills: 0 | Status: DISCONTINUED | OUTPATIENT
Start: 2018-01-01 | End: 2018-01-01

## 2018-01-01 RX ORDER — VANCOMYCIN HCL 1 G
1000 VIAL (EA) INTRAVENOUS EVERY 12 HOURS
Qty: 0 | Refills: 0 | Status: DISCONTINUED | OUTPATIENT
Start: 2018-01-01 | End: 2018-01-01

## 2018-01-01 RX ORDER — INSULIN LISPRO 100/ML
VIAL (ML) SUBCUTANEOUS
Qty: 0 | Refills: 0 | Status: DISCONTINUED | OUTPATIENT
Start: 2018-01-01 | End: 2018-01-01

## 2018-01-01 RX ORDER — RIVAROXABAN 15 MG-20MG
20 KIT ORAL EVERY 24 HOURS
Qty: 0 | Refills: 0 | Status: DISCONTINUED | OUTPATIENT
Start: 2018-01-01 | End: 2018-01-01

## 2018-01-01 RX ORDER — CEFEPIME 1 G/1
1000 INJECTION, POWDER, FOR SOLUTION INTRAMUSCULAR; INTRAVENOUS ONCE
Qty: 0 | Refills: 0 | Status: COMPLETED | OUTPATIENT
Start: 2018-01-01 | End: 2018-01-01

## 2018-01-01 RX ORDER — INSULIN GLARGINE 100 [IU]/ML
20 INJECTION, SOLUTION SUBCUTANEOUS AT BEDTIME
Qty: 0 | Refills: 0 | Status: DISCONTINUED | OUTPATIENT
Start: 2018-01-01 | End: 2018-01-01

## 2018-01-01 RX ORDER — DEXTROSE 50 % IN WATER 50 %
12.5 SYRINGE (ML) INTRAVENOUS ONCE
Qty: 0 | Refills: 0 | Status: DISCONTINUED | OUTPATIENT
Start: 2018-01-01 | End: 2018-01-01

## 2018-01-01 RX ORDER — MORPHINE SULFATE 50 MG/1
8 CAPSULE, EXTENDED RELEASE ORAL ONCE
Qty: 0 | Refills: 0 | Status: DISCONTINUED | OUTPATIENT
Start: 2018-01-01 | End: 2018-01-01

## 2018-01-01 RX ORDER — IPRATROPIUM/ALBUTEROL SULFATE 18-103MCG
3 AEROSOL WITH ADAPTER (GRAM) INHALATION ONCE
Qty: 0 | Refills: 0 | Status: COMPLETED | OUTPATIENT
Start: 2018-01-01 | End: 2018-01-01

## 2018-01-01 RX ORDER — SODIUM CHLORIDE 9 MG/ML
1000 INJECTION INTRAMUSCULAR; INTRAVENOUS; SUBCUTANEOUS
Qty: 0 | Refills: 0 | Status: DISCONTINUED | OUTPATIENT
Start: 2018-01-01 | End: 2018-01-01

## 2018-01-01 RX ORDER — DEXTROSE 50 % IN WATER 50 %
15 SYRINGE (ML) INTRAVENOUS ONCE
Qty: 0 | Refills: 0 | Status: DISCONTINUED | OUTPATIENT
Start: 2018-01-01 | End: 2018-01-01

## 2018-01-01 RX ORDER — NYSTATIN CREAM 100000 [USP'U]/G
1 CREAM TOPICAL
Qty: 0 | Refills: 0 | Status: DISCONTINUED | OUTPATIENT
Start: 2018-01-01 | End: 2018-01-01

## 2018-01-01 RX ORDER — CEFEPIME 1 G/1
1000 INJECTION, POWDER, FOR SOLUTION INTRAMUSCULAR; INTRAVENOUS EVERY 12 HOURS
Qty: 0 | Refills: 0 | Status: DISCONTINUED | OUTPATIENT
Start: 2018-01-01 | End: 2018-01-01

## 2018-01-01 RX ORDER — FUROSEMIDE 40 MG
40 TABLET ORAL
Qty: 0 | Refills: 0 | Status: DISCONTINUED | OUTPATIENT
Start: 2018-01-01 | End: 2018-01-01

## 2018-01-01 RX ORDER — PANTOPRAZOLE SODIUM 20 MG/1
40 TABLET, DELAYED RELEASE ORAL DAILY
Qty: 0 | Refills: 0 | Status: DISCONTINUED | OUTPATIENT
Start: 2018-01-01 | End: 2018-01-01

## 2018-01-01 RX ORDER — MAGNESIUM SULFATE 500 MG/ML
2 VIAL (ML) INJECTION ONCE
Qty: 0 | Refills: 0 | Status: COMPLETED | OUTPATIENT
Start: 2018-01-01 | End: 2018-01-01

## 2018-01-01 RX ORDER — POTASSIUM CHLORIDE 20 MEQ
20 PACKET (EA) ORAL ONCE
Qty: 0 | Refills: 0 | Status: COMPLETED | OUTPATIENT
Start: 2018-01-01 | End: 2018-01-01

## 2018-01-01 RX ORDER — ENOXAPARIN SODIUM 100 MG/ML
40 INJECTION SUBCUTANEOUS DAILY
Qty: 0 | Refills: 0 | Status: DISCONTINUED | OUTPATIENT
Start: 2018-01-01 | End: 2018-01-01

## 2018-01-01 RX ORDER — AZITHROMYCIN 500 MG/1
1 TABLET, FILM COATED ORAL
Qty: 2 | Refills: 0 | OUTPATIENT
Start: 2018-01-01

## 2018-01-01 RX ORDER — BUMETANIDE 0.25 MG/ML
1.5 INJECTION INTRAMUSCULAR; INTRAVENOUS
Qty: 0 | Refills: 0 | Status: DISCONTINUED | OUTPATIENT
Start: 2018-01-01 | End: 2018-01-01

## 2018-01-01 RX ORDER — CEFTRIAXONE 500 MG/1
INJECTION, POWDER, FOR SOLUTION INTRAMUSCULAR; INTRAVENOUS
Qty: 0 | Refills: 0 | Status: DISCONTINUED | OUTPATIENT
Start: 2018-01-01 | End: 2018-01-01

## 2018-01-01 RX ORDER — ALBUTEROL 90 UG/1
1 AEROSOL, METERED ORAL EVERY 4 HOURS
Qty: 0 | Refills: 0 | Status: DISCONTINUED | OUTPATIENT
Start: 2018-01-01 | End: 2018-01-01

## 2018-01-01 RX ORDER — GLUCAGON INJECTION, SOLUTION 0.5 MG/.1ML
1 INJECTION, SOLUTION SUBCUTANEOUS ONCE
Qty: 0 | Refills: 0 | Status: DISCONTINUED | OUTPATIENT
Start: 2018-01-01 | End: 2018-01-01

## 2018-01-01 RX ORDER — SODIUM CHLORIDE 9 MG/ML
1000 INJECTION INTRAMUSCULAR; INTRAVENOUS; SUBCUTANEOUS ONCE
Qty: 0 | Refills: 0 | Status: COMPLETED | OUTPATIENT
Start: 2018-01-01 | End: 2018-01-01

## 2018-01-01 RX ORDER — MORPHINE SULFATE 50 MG/1
2 CAPSULE, EXTENDED RELEASE ORAL
Qty: 100 | Refills: 0 | Status: DISCONTINUED | OUTPATIENT
Start: 2018-01-01 | End: 2018-01-01

## 2018-01-01 RX ORDER — INSULIN LISPRO 100/ML
6 VIAL (ML) SUBCUTANEOUS
Qty: 0 | Refills: 0 | Status: DISCONTINUED | OUTPATIENT
Start: 2018-01-01 | End: 2018-01-01

## 2018-01-01 RX ORDER — MORPHINE SULFATE 50 MG/1
2 CAPSULE, EXTENDED RELEASE ORAL
Qty: 0 | Refills: 0 | Status: DISCONTINUED | OUTPATIENT
Start: 2018-01-01 | End: 2018-01-01

## 2018-01-01 RX ORDER — RIVAROXABAN 15 MG-20MG
1 KIT ORAL
Qty: 0 | Refills: 0 | COMMUNITY
Start: 2018-01-01

## 2018-01-01 RX ORDER — PANTOPRAZOLE SODIUM 20 MG/1
40 TABLET, DELAYED RELEASE ORAL
Qty: 0 | Refills: 0 | Status: DISCONTINUED | OUTPATIENT
Start: 2018-01-01 | End: 2018-01-01

## 2018-01-01 RX ORDER — AZITHROMYCIN 500 MG/1
500 TABLET, FILM COATED ORAL EVERY 24 HOURS
Qty: 0 | Refills: 0 | Status: DISCONTINUED | OUTPATIENT
Start: 2018-01-01 | End: 2018-01-01

## 2018-01-01 RX ORDER — SODIUM CHLORIDE 9 MG/ML
2000 INJECTION, SOLUTION INTRAVENOUS ONCE
Qty: 0 | Refills: 0 | Status: COMPLETED | OUTPATIENT
Start: 2018-01-01 | End: 2018-01-01

## 2018-01-01 RX ORDER — ALBUTEROL 90 UG/1
1 AEROSOL, METERED ORAL
Qty: 0 | Refills: 0 | COMMUNITY
Start: 2018-01-01

## 2018-01-01 RX ORDER — BUMETANIDE 0.25 MG/ML
0.5 INJECTION INTRAMUSCULAR; INTRAVENOUS DAILY
Qty: 0 | Refills: 0 | Status: DISCONTINUED | OUTPATIENT
Start: 2018-01-01 | End: 2018-01-01

## 2018-01-01 RX ORDER — POTASSIUM CHLORIDE 20 MEQ
40 PACKET (EA) ORAL ONCE
Qty: 0 | Refills: 0 | Status: COMPLETED | OUTPATIENT
Start: 2018-01-01 | End: 2018-01-01

## 2018-01-01 RX ORDER — AZITHROMYCIN 500 MG/1
500 TABLET, FILM COATED ORAL ONCE
Qty: 0 | Refills: 0 | Status: COMPLETED | OUTPATIENT
Start: 2018-01-01 | End: 2018-01-01

## 2018-01-01 RX ORDER — FUROSEMIDE 40 MG
80 TABLET ORAL ONCE
Qty: 0 | Refills: 0 | Status: COMPLETED | OUTPATIENT
Start: 2018-01-01 | End: 2018-01-01

## 2018-01-01 RX ORDER — VANCOMYCIN HCL 1 G
750 VIAL (EA) INTRAVENOUS EVERY 12 HOURS
Qty: 0 | Refills: 0 | Status: DISCONTINUED | OUTPATIENT
Start: 2018-01-01 | End: 2018-01-01

## 2018-01-01 RX ORDER — IPRATROPIUM/ALBUTEROL SULFATE 18-103MCG
3 AEROSOL WITH ADAPTER (GRAM) INHALATION EVERY 6 HOURS
Qty: 0 | Refills: 0 | Status: DISCONTINUED | OUTPATIENT
Start: 2018-01-01 | End: 2018-01-01

## 2018-01-01 RX ORDER — DEXAMETHASONE 0.5 MG/5ML
10 ELIXIR ORAL ONCE
Qty: 0 | Refills: 0 | Status: COMPLETED | OUTPATIENT
Start: 2018-01-01 | End: 2018-01-01

## 2018-01-01 RX ORDER — BUMETANIDE 0.25 MG/ML
1 INJECTION INTRAMUSCULAR; INTRAVENOUS
Qty: 0 | Refills: 0 | COMMUNITY
Start: 2018-01-01

## 2018-01-01 RX ORDER — CEFTRIAXONE 500 MG/1
1 INJECTION, POWDER, FOR SOLUTION INTRAMUSCULAR; INTRAVENOUS ONCE
Qty: 0 | Refills: 0 | Status: COMPLETED | OUTPATIENT
Start: 2018-01-01 | End: 2018-01-01

## 2018-01-01 RX ORDER — AZITHROMYCIN 500 MG/1
500 TABLET, FILM COATED ORAL DAILY
Qty: 0 | Refills: 0 | Status: DISCONTINUED | OUTPATIENT
Start: 2018-01-01 | End: 2018-01-01

## 2018-01-01 RX ADMIN — Medication 2: at 08:35

## 2018-01-01 RX ADMIN — RIVAROXABAN 20 MILLIGRAM(S): KIT at 17:11

## 2018-01-01 RX ADMIN — Medication 3 MILLILITER(S): at 20:05

## 2018-01-01 RX ADMIN — Medication 50 MICROGRAM(S): at 05:26

## 2018-01-01 RX ADMIN — SODIUM CHLORIDE 50 MILLILITER(S): 9 INJECTION INTRAMUSCULAR; INTRAVENOUS; SUBCUTANEOUS at 00:46

## 2018-01-01 RX ADMIN — AZITHROMYCIN 255 MILLIGRAM(S): 500 TABLET, FILM COATED ORAL at 00:05

## 2018-01-01 RX ADMIN — Medication 3 MILLILITER(S): at 08:20

## 2018-01-01 RX ADMIN — Medication 60 MILLIGRAM(S): at 05:34

## 2018-01-01 RX ADMIN — INSULIN GLARGINE 20 UNIT(S): 100 INJECTION, SOLUTION SUBCUTANEOUS at 22:42

## 2018-01-01 RX ADMIN — Medication 50 MICROGRAM(S): at 05:39

## 2018-01-01 RX ADMIN — Medication 3 MILLILITER(S): at 13:15

## 2018-01-01 RX ADMIN — Medication 3 MILLILITER(S): at 19:41

## 2018-01-01 RX ADMIN — NYSTATIN CREAM 1 APPLICATION(S): 100000 CREAM TOPICAL at 17:36

## 2018-01-01 RX ADMIN — Medication 2: at 16:43

## 2018-01-01 RX ADMIN — MORPHINE SULFATE 8 MILLIGRAM(S): 50 CAPSULE, EXTENDED RELEASE ORAL at 05:38

## 2018-01-01 RX ADMIN — RIVAROXABAN 20 MILLIGRAM(S): KIT at 17:49

## 2018-01-01 RX ADMIN — Medication 80 MILLIGRAM(S): at 06:02

## 2018-01-01 RX ADMIN — PANTOPRAZOLE SODIUM 40 MILLIGRAM(S): 20 TABLET, DELAYED RELEASE ORAL at 12:00

## 2018-01-01 RX ADMIN — Medication 80 MILLIGRAM(S): at 05:26

## 2018-01-01 RX ADMIN — Medication 80 MILLIGRAM(S): at 17:36

## 2018-01-01 RX ADMIN — Medication 3 MILLILITER(S): at 15:08

## 2018-01-01 RX ADMIN — Medication 125 MILLIGRAM(S): at 18:59

## 2018-01-01 RX ADMIN — Medication 3 MILLILITER(S): at 07:45

## 2018-01-01 RX ADMIN — RIVAROXABAN 20 MILLIGRAM(S): KIT at 17:17

## 2018-01-01 RX ADMIN — Medication 3 MILLILITER(S): at 15:15

## 2018-01-01 RX ADMIN — Medication 3 MILLILITER(S): at 13:50

## 2018-01-01 RX ADMIN — NYSTATIN CREAM 1 APPLICATION(S): 100000 CREAM TOPICAL at 17:08

## 2018-01-01 RX ADMIN — CEFTRIAXONE 100 GRAM(S): 500 INJECTION, POWDER, FOR SOLUTION INTRAMUSCULAR; INTRAVENOUS at 00:01

## 2018-01-01 RX ADMIN — SODIUM CHLORIDE 50 MILLILITER(S): 9 INJECTION INTRAMUSCULAR; INTRAVENOUS; SUBCUTANEOUS at 22:34

## 2018-01-01 RX ADMIN — CEFTRIAXONE 100 GRAM(S): 500 INJECTION, POWDER, FOR SOLUTION INTRAMUSCULAR; INTRAVENOUS at 17:32

## 2018-01-01 RX ADMIN — AZITHROMYCIN 255 MILLIGRAM(S): 500 TABLET, FILM COATED ORAL at 11:22

## 2018-01-01 RX ADMIN — BUMETANIDE 1 MILLIGRAM(S): 0.25 INJECTION INTRAMUSCULAR; INTRAVENOUS at 22:22

## 2018-01-01 RX ADMIN — Medication 2: at 11:52

## 2018-01-01 RX ADMIN — Medication 3 MILLILITER(S): at 09:14

## 2018-01-01 RX ADMIN — Medication 3 MILLILITER(S): at 15:48

## 2018-01-01 RX ADMIN — FENTANYL CITRATE 5 MICROGRAM(S)/KG/HR: 50 INJECTION INTRAVENOUS at 05:37

## 2018-01-01 RX ADMIN — BUMETANIDE 1 MILLIGRAM(S): 0.25 INJECTION INTRAMUSCULAR; INTRAVENOUS at 11:33

## 2018-01-01 RX ADMIN — Medication 6 UNIT(S): at 11:53

## 2018-01-01 RX ADMIN — PANTOPRAZOLE SODIUM 40 MILLIGRAM(S): 20 TABLET, DELAYED RELEASE ORAL at 12:18

## 2018-01-01 RX ADMIN — Medication 0: at 11:30

## 2018-01-01 RX ADMIN — SODIUM CHLORIDE 3000 MILLILITER(S): 9 INJECTION, SOLUTION INTRAVENOUS at 01:15

## 2018-01-01 RX ADMIN — Medication 80 MILLIGRAM(S): at 23:42

## 2018-01-01 RX ADMIN — Medication 50 MICROGRAM(S): at 05:24

## 2018-01-01 RX ADMIN — Medication 80 MILLIGRAM(S): at 06:08

## 2018-01-01 RX ADMIN — AZITHROMYCIN 255 MILLIGRAM(S): 500 TABLET, FILM COATED ORAL at 14:21

## 2018-01-01 RX ADMIN — SODIUM CHLORIDE 2400 MILLILITER(S): 9 INJECTION INTRAMUSCULAR; INTRAVENOUS; SUBCUTANEOUS at 18:43

## 2018-01-01 RX ADMIN — CEFTRIAXONE 100 GRAM(S): 500 INJECTION, POWDER, FOR SOLUTION INTRAMUSCULAR; INTRAVENOUS at 11:23

## 2018-01-01 RX ADMIN — CEFTRIAXONE 100 GRAM(S): 500 INJECTION, POWDER, FOR SOLUTION INTRAMUSCULAR; INTRAVENOUS at 11:33

## 2018-01-01 RX ADMIN — Medication 60 MILLIGRAM(S): at 17:08

## 2018-01-01 RX ADMIN — Medication 40 MILLIEQUIVALENT(S): at 14:52

## 2018-01-01 RX ADMIN — Medication 60 MILLIGRAM(S): at 05:39

## 2018-01-01 RX ADMIN — CEFTRIAXONE 100 GRAM(S): 500 INJECTION, POWDER, FOR SOLUTION INTRAMUSCULAR; INTRAVENOUS at 12:00

## 2018-01-01 RX ADMIN — Medication 6 UNIT(S): at 08:37

## 2018-01-01 RX ADMIN — Medication 60 MILLIGRAM(S): at 05:44

## 2018-01-01 RX ADMIN — BUMETANIDE 1.5 MILLIGRAM(S): 0.25 INJECTION INTRAMUSCULAR; INTRAVENOUS at 06:08

## 2018-01-01 RX ADMIN — MORPHINE SULFATE 2 MG/HR: 50 CAPSULE, EXTENDED RELEASE ORAL at 06:51

## 2018-01-01 RX ADMIN — Medication 3 MILLILITER(S): at 14:41

## 2018-01-01 RX ADMIN — Medication 60 MILLIGRAM(S): at 22:12

## 2018-01-01 RX ADMIN — Medication 60 MILLIGRAM(S): at 13:32

## 2018-01-01 RX ADMIN — AZITHROMYCIN 255 MILLIGRAM(S): 500 TABLET, FILM COATED ORAL at 11:34

## 2018-01-01 RX ADMIN — Medication 10 MILLIGRAM(S): at 01:15

## 2018-01-01 RX ADMIN — Medication 3 MILLILITER(S): at 02:05

## 2018-01-01 RX ADMIN — AZITHROMYCIN 255 MILLIGRAM(S): 500 TABLET, FILM COATED ORAL at 12:08

## 2018-01-01 RX ADMIN — Medication 6 UNIT(S): at 11:31

## 2018-01-01 RX ADMIN — CEFTRIAXONE 100 GRAM(S): 500 INJECTION, POWDER, FOR SOLUTION INTRAMUSCULAR; INTRAVENOUS at 23:37

## 2018-01-01 RX ADMIN — INSULIN GLARGINE 20 UNIT(S): 100 INJECTION, SOLUTION SUBCUTANEOUS at 21:22

## 2018-01-01 RX ADMIN — Medication 6 UNIT(S): at 17:15

## 2018-01-01 RX ADMIN — RIVAROXABAN 20 MILLIGRAM(S): KIT at 18:54

## 2018-01-01 RX ADMIN — Medication 3 MILLILITER(S): at 20:46

## 2018-01-01 RX ADMIN — RIVAROXABAN 20 MILLIGRAM(S): KIT at 16:49

## 2018-01-01 RX ADMIN — Medication 3 MILLILITER(S): at 02:19

## 2018-01-01 RX ADMIN — CEFEPIME 100 MILLIGRAM(S): 1 INJECTION, POWDER, FOR SOLUTION INTRAMUSCULAR; INTRAVENOUS at 23:46

## 2018-01-01 RX ADMIN — Medication 3 MILLILITER(S): at 20:03

## 2018-01-01 RX ADMIN — Medication 6 UNIT(S): at 08:36

## 2018-01-01 RX ADMIN — Medication 50 MICROGRAM(S): at 05:44

## 2018-01-01 RX ADMIN — Medication 3 MILLILITER(S): at 09:25

## 2018-01-01 RX ADMIN — PANTOPRAZOLE SODIUM 40 MILLIGRAM(S): 20 TABLET, DELAYED RELEASE ORAL at 06:08

## 2018-01-01 RX ADMIN — Medication 3 MILLILITER(S): at 09:47

## 2018-01-01 RX ADMIN — Medication 50 GRAM(S): at 12:43

## 2018-01-01 RX ADMIN — PANTOPRAZOLE SODIUM 40 MILLIGRAM(S): 20 TABLET, DELAYED RELEASE ORAL at 06:01

## 2018-01-01 RX ADMIN — RIVAROXABAN 20 MILLIGRAM(S): KIT at 17:35

## 2018-01-01 RX ADMIN — Medication 3 MILLILITER(S): at 01:16

## 2018-01-01 RX ADMIN — Medication 50 MICROGRAM(S): at 06:08

## 2018-01-01 RX ADMIN — PANTOPRAZOLE SODIUM 40 MILLIGRAM(S): 20 TABLET, DELAYED RELEASE ORAL at 12:37

## 2018-01-01 RX ADMIN — RIVAROXABAN 20 MILLIGRAM(S): KIT at 17:08

## 2018-01-01 RX ADMIN — CEFTRIAXONE 100 GRAM(S): 500 INJECTION, POWDER, FOR SOLUTION INTRAMUSCULAR; INTRAVENOUS at 23:42

## 2018-01-01 RX ADMIN — CEFTRIAXONE 100 GRAM(S): 500 INJECTION, POWDER, FOR SOLUTION INTRAMUSCULAR; INTRAVENOUS at 14:21

## 2018-01-01 RX ADMIN — NYSTATIN CREAM 1 APPLICATION(S): 100000 CREAM TOPICAL at 05:23

## 2018-01-01 RX ADMIN — RIVAROXABAN 20 MILLIGRAM(S): KIT at 18:02

## 2018-01-01 RX ADMIN — AZITHROMYCIN 255 MILLIGRAM(S): 500 TABLET, FILM COATED ORAL at 12:18

## 2018-01-01 RX ADMIN — PANTOPRAZOLE SODIUM 40 MILLIGRAM(S): 20 TABLET, DELAYED RELEASE ORAL at 14:24

## 2018-01-01 RX ADMIN — Medication 3 MILLILITER(S): at 19:52

## 2018-01-01 RX ADMIN — BUMETANIDE 1 MILLIGRAM(S): 0.25 INJECTION INTRAMUSCULAR; INTRAVENOUS at 17:50

## 2018-01-01 RX ADMIN — Medication 3 MILLILITER(S): at 08:36

## 2018-01-01 RX ADMIN — Medication 80 MILLIGRAM(S): at 18:02

## 2018-01-01 RX ADMIN — SODIUM CHLORIDE 1000 MILLILITER(S): 9 INJECTION, SOLUTION INTRAVENOUS at 23:46

## 2018-01-01 RX ADMIN — Medication 3 MILLILITER(S): at 19:28

## 2018-01-01 RX ADMIN — Medication 3 MILLILITER(S): at 20:34

## 2018-01-01 RX ADMIN — Medication 3: at 08:40

## 2018-01-01 RX ADMIN — RIVAROXABAN 20 MILLIGRAM(S): KIT at 17:36

## 2018-01-01 RX ADMIN — Medication 3 MILLILITER(S): at 19:24

## 2018-01-01 RX ADMIN — Medication 3 MILLILITER(S): at 20:39

## 2018-01-01 RX ADMIN — CEFTRIAXONE 100 GRAM(S): 500 INJECTION, POWDER, FOR SOLUTION INTRAMUSCULAR; INTRAVENOUS at 12:26

## 2018-01-01 RX ADMIN — AZITHROMYCIN 255 MILLIGRAM(S): 500 TABLET, FILM COATED ORAL at 12:00

## 2018-01-01 RX ADMIN — PANTOPRAZOLE SODIUM 40 MILLIGRAM(S): 20 TABLET, DELAYED RELEASE ORAL at 11:37

## 2018-01-01 RX ADMIN — PANTOPRAZOLE SODIUM 40 MILLIGRAM(S): 20 TABLET, DELAYED RELEASE ORAL at 05:24

## 2018-01-01 RX ADMIN — Medication 60 MILLIGRAM(S): at 05:17

## 2018-01-01 RX ADMIN — Medication 3 MILLILITER(S): at 10:29

## 2018-01-01 RX ADMIN — Medication 3 MILLILITER(S): at 14:09

## 2018-01-01 RX ADMIN — Medication 6 UNIT(S): at 16:45

## 2018-01-01 RX ADMIN — Medication 3 MILLILITER(S): at 13:04

## 2018-01-01 RX ADMIN — Medication 50 MICROGRAM(S): at 05:17

## 2018-01-01 RX ADMIN — Medication 50 MILLIEQUIVALENT(S): at 21:56

## 2018-01-01 RX ADMIN — Medication 2: at 17:12

## 2018-02-19 NOTE — ED PROVIDER NOTE - PHYSICAL EXAMINATION
pt in NAD, AAOx3, head NC/AT, CN II-XII intact, lungs CTA B/L, distant BS on O2, CV S1S2 regular, abdomen soft/NT/ND/(+)BS, ext (-) edema. motor 5/5x4, sensation intact

## 2018-02-19 NOTE — ED PROVIDER NOTE - OBJECTIVE STATEMENT
77 y.o. female, pMH of COPD on home O2, comes to the ED because lost electricity on the block. No complains otherwise.

## 2018-02-19 NOTE — ED PROVIDER NOTE - NS ED ROS FT
Review of Systems:  	•	CONSTITUTIONAL - no fever, no diaphoresis, no chills  	•	SKIN - no rash  	•	HEMATOLOGIC - no bleeding, no bruising. H/o anemia on iron infusions  	•	ENT - no change in hearing, no sore throat, no ear pain or tinnitus  	•	RESPIRATORY - chronic SOB on home O2  	•	CARDIAC - no chest pain, no palpitations  	•	GI - no abd pain, no nausea, no vomiting, no diarrhea, no constipation  	•	MUSCULOSKELETAL - no joint paint, no swelling, no redness  	•	NEUROLOGIC - no weakness, no headache, no paresthesias, no LOC  	•	PSYCH - no anxiety, non suicidal, non homicidal, no hallucination, no depression

## 2018-02-19 NOTE — ED PROVIDER NOTE - MEDICAL DECISION MAKING DETAILS
Pt came because no electricity in the house and she needs home O2. Now electricity is back, will discharge.

## 2018-05-17 NOTE — ED PROVIDER NOTE - NS ED ROS FT
Pt denied fvr/chills, HA, visual changes, dizziness, light headedness, presyncope, LOC, CP, OERLLANA,  hemoptysis, abd pain, n/v/d, changes in bowel or bladder habits, LE edema, numbness, weakness, changes in gait.  The pt also denied recent travel outside of the country, recent illnesses, sick contacts, recent airplane trips or periods of immobility/bedbound.

## 2018-05-17 NOTE — ED PROVIDER NOTE - ATTENDING CONTRIBUTION TO CARE
Pt is a 76yo female with hx of lung ca who comes in for SOB.  Recent CT shows PNA.  Hx of DVT on Xarelto.    I am unable to obtain a comprehensive history, review of systems, past medical history, and/or physical exam due to constraints imposed by the urgency of the patient's clinical condition and/or mental status.     Exam: b/l rhonchi, mild LE edema, no calf tenderness, soft diffusely tender abdomen, cap refill <2s  Imp: sepsis, r/o PE  Plan: labs, EKG, XR chest, IVF, abx, admission

## 2018-05-17 NOTE — ED PROVIDER NOTE - PHYSICAL EXAMINATION
AOx4, Non toxic appearing, in mild respiratory distress. Skin  warm and dry, no acute rash. PERRLA/EOM, conjunctiva and sclera clear. MM moist, no nasal discharge.  Pharynx and TM's unremarkable. Neck supple nt, no meningeal signs. Heart RRR s1s2 nl, no rub/murmur. Lungs- BS equal, CTAB. Abdomen soft ntnd no r/g. Extremities- moves all, +equal distal pulses, sensation wnl, normal ROM. No LE edema, calves nttp b/l.

## 2018-05-17 NOTE — ED PROVIDER NOTE - OBJECTIVE STATEMENT
78 yo F hx of copd, Lung ca with RU lobectomy here for sudden onset shortness of breath, usually on 3 L NC now having respiratory distress without chest pain, +cough no fevers.

## 2018-05-17 NOTE — ED PROVIDER NOTE - MEDICAL DECISION MAKING DETAILS
I personally evaluated the patient. I reviewed the Resident’s or Physician Assistant’s note (as assigned above), and agree with the findings and plan except as documented in my note. Patient has been endorsed to the ICU. Patient's  who is a pulmonologist is declining intubation at this time.

## 2018-05-17 NOTE — ED PROVIDER NOTE - PROGRESS NOTE DETAILS
at bedside is pulmonologist. MELANY lucero accepted signout from DR. Alarcon, pateint doing well, patient will go to CT scan. patient tolerating bipap well. No deterioration in status pt  declining intubation at this time, pt signout out to icu resident.

## 2018-05-18 NOTE — CONSULT NOTE ADULT - ASSESSMENT
IMPRESSION:    ACOTE ON CHRONIC HYPERCARBIC RESP FAILURE   COPD ECAVERBATION  KENNY NOT ON CPAP    PLAN:    CNS: Monitor. avoid sedative meds.     HEENT: Oral care, bipap mask care     PULMONARY:  HOB @ 45 degrees, solumedrol 60 q 8, duonebs q4 plus prn, change to avaps. ABG now     CARDIOVASCULAR: Keep -ve fluid balance     GI: GI prophylaxis.  Feeding later when off niv     RENAL:  Follow up lytes.  Correct as needed    INFECTIOUS DISEASE: Follow up cultures. Azithromycin.     HEMATOLOGICAL:  DVT prophylaxis.    ENDOCRINE:  Follow up FS.  Insulin protocol if needed.    full code  monitor in the icu. possible downgrade in pm if abg and mental status improving IMPRESSION:    AHRF  COPD exacerbation  Chronic hypercapnic respiratory failure  HO NSCLC SP resection  KENNY not compliant with CPAP therapy     PLAN:    CNS: Avoid sedative meds.     HEENT: Oral care,    PULMONARY:  HOB @ 45 degrees, solumedrol 60 q 8, duonebs q4 plus prn, change to avaps 4 hours on and 4 hours off and during sleep.  Repeat ABG now     CARDIOVASCULAR: I=O    GI: GI prophylaxis.  Feeding if she tolerated off NIV.     RENAL:  Follow up lytes.  Correct as needed    INFECTIOUS DISEASE: Follow up cultures. Rocephin / Azithromycin for now    HEMATOLOGICAL:  DVT prophylaxis.    ENDOCRINE:  Follow up FS.  Insulin protocol if needed.    MICU monitoring for now

## 2018-05-18 NOTE — H&P ADULT - NSHPPHYSICALEXAM_GEN_ALL_CORE
On BIPAP   In respiratory distress, Tachypneic  Diffuse abdominal tenderness, no guarding  RRR normal S1S2  No LE edema  Grossly non focal or deficits

## 2018-05-18 NOTE — H&P ADULT - PMH
Chronic obstructive pulmonary disease, unspecified COPD type    DVT, lower extremity    Hypothyroidism    Lung cancer    KENNY on CPAP    Urinary retention

## 2018-05-18 NOTE — H&P ADULT - NSHPLABSRESULTS_GEN_ALL_CORE
10.8   9.43  )-----------( 325      ( 17 May 2018 23:56 )             32.9     05-17    138  |  83<L>  |  25<H>  ----------------------------<  151<H>  4.0   |  49<HH>  |  1.0    Ca    8.9      17 May 2018 23:56    TPro  6.4  /  Alb  3.6  /  TBili  <0.2  /  DBili  x   /  AST  19  /  ALT  15  /  AlkPhos  126<H>  05-17    ABG - ( 18 May 2018 00:58 )  pH, Arterial: 7.36  pH, Blood: x     /  pCO2: 88    /  pO2: 87    / HCO3: 50    / Base Excess: 20.3  /  SaO2: 96        < from: CT Chest w/ IV Cont (05.18.18 @ 02:44) >    No pulmonary embolism.    No acute pathology within the chest, abdomen, or pelvis.    < end of copied text >

## 2018-05-18 NOTE — H&P ADULT - NSHPSOCIALHISTORY_GEN_ALL_CORE
Ex heavy smoker stopped around 30 years ago  Wheelchair bound  Lives with   chronic love for retention

## 2018-05-18 NOTE — H&P ADULT - ASSESSMENT
Assessment:  · Assessment:  ·	Acute on Chronic Hypoxic Hypercapnic Respiratory failure  ·	COPD exacerbation?  ·	Possible acute infectious bronchitis/ viral prodrome    Plan:  ·	NIV, O2 supplementation Keep SPO2>90%, Serial ABGs  ·	IV steroids Nebz IV antibiotics  ·	NPO for now untill weaning O2 successful  ·	Hold Xarelto for now. If Unable to wean off BIPAP start Heparin in afternoon  ·	Garcia fir chronic retention  ·	Bed rest as wheelchair bound  ·	DVT ppx   ·	PPI ppx  ·	Aspiration precautions  ·	Full code for now  ·	Guarded prognsis Assessment:  ·	Acute on Chronic Hypoxic Hypercapnic Respiratory failure  ·	COPD exacerbation?  ·	Possible acute infectious bronchitis/ viral prodrome    Plan:  ·	NIV, O2 supplementation Keep SPO2>90%, Serial ABGs  ·	IV steroids Nebz IV antibiotics  ·	NPO for now untill weaning O2 successful  ·	Hold Xarelto for now. If Unable to wean off BIPAP start Heparin in afternoon  ·	Garcia for chronic retention  ·	Bed rest as wheelchair bound  ·	DVT ppx   ·	PPI ppx  ·	Aspiration precautions  ·	Full code for now  ·	Guarded prognosis      ·	Please confirm medications with  in AM

## 2018-05-18 NOTE — H&P ADULT - HISTORY OF PRESENT ILLNESS
79 yo F with PMH of COPD on 3.5L O2 oxygen at home, KENNY on CPAP, Lung cancer s/p RUL lobectomy in 2014, hx of DVT on Xarelto, Hypothyroidism, Urinary retention with chronic Garcia Wheelchair bound presenting with above chief complaint. She reports that she started feeling weak today hence she came to the ED. She also is reporting increasing sputum production but no cough or chills. She was recently in the hospital 2 weeks ago for COPD exacerbation. 77 yo F with PMH of COPD on 3.5L O2 oxygen at home, KENNY on CPAP, Lung cancer s/p RUL lobectomy in 2014, hx of DVT on Xarelto, Hypothyroidism, Urinary retention with chronic Garcia Wheelchair bound presenting with above chief complaint. She reports that she started feeling weak today hence she came to the ED. She also is reporting increasing sputum production but no cough or chills. She was recently in the hospital 2 weeks ago for COPD exacerbation. In ED, patient was hypoxic placed on BIPAP desaturated to the 70's then improved. ABG showed increased CO2 of 80 and Increased Bicarb of 55 on ABG. CT scan of chest done and Cefepime vancomycin given. Admitted to ICU for furhter monitoring.  who is a pulmonologist refused intubation for now.

## 2018-05-18 NOTE — CONSULT NOTE ADULT - SUBJECTIVE AND OBJECTIVE BOX
Patient is a 78y old  Female who presents with a chief complaint of Weakness, fatigue, chills, increased sputum production (18 May 2018 06:27)      HPI:  79 yo F with PMH of COPD on 3.5L O2 oxygen at home, KENNY on CPAP, Lung cancer s/p RUL lobectomy in , hx of DVT on Xarelto, Hypothyroidism, Urinary retention with chronic Garcia Wheelchair bound presenting with above chief complaint. She reports that she started feeling weak today hence she came to the ED. She also is reporting increasing sputum production but no cough or chills. She was recently in the hospital 2 weeks ago for COPD exacerbation. In ED, patient was hypoxic placed on BIPAP desaturated to the 70's then improved. ABG showed increased CO2 of 80 and Increased Bicarb of 55 on ABG. CT scan of chest done and Cefepime vancomycin given. Admitted to ICU for furhter monitoring.  who is a pulmonologist refused intubation for now. (18 May 2018 06:27)    Currently pt is on Bipap, sleepy but answers questions appropriately. Got sob last night and came in. No chest pain/cough/fever. Only on albuterol prn at home. Does not use CPAP regularly. Sees her  as pulm. Not intubated in the past. Recent COPD exacerbation 1 week ago     PAST MEDICAL & SURGICAL HISTORY:  Urinary retention  Lung cancer  DVT, lower extremity  Hypothyroidism  KENNY on CPAP  Chronic obstructive pulmonary disease, unspecified COPD type  History of hip surgery  S/P knee surgery  S/P lobectomy of lung        SOCIAL HX:   quit , cannot obtain further     Allergies    Cipro (Unknown)    Intolerances        FAMILY HISTORY:  No pertinent family history      Review of systems : -ve other above     PHYSICAL EXAM    ICU Vital Signs Last 24 Hrs  T(C): 37.1 (18 May 2018 06:30), Max: 38.2 (17 May 2018 23:30)  T(F): 98.7 (18 May 2018 06:30), Max: 100.7 (17 May 2018 23:30)  HR: 70 (18 May 2018 06:30) (70 - 104)  BP: 117/63 (18 May 2018 06:30) (93/51 - 167/86)  RR: 18 (18 May 2018 06:30) (18 - 25)  SpO2: 94% (18 May 2018 06:30) (84% - 97%)      General: Sleepy, NAD on bipap  HEENT: No LN   Lymph Nodes: No lN   Lungs: Bilateral BS, no wheezing   Cardiovascular: Regular  Abdomen: Soft, Positive BS  Extremities: No clubbing  Skin: warm   Neurological: non focal         LABS:                          10.8   9.43  )-----------( 325      ( 17 May 2018 23:56 )             32.9                                               05    138  |  83<L>  |  25<H>  ----------------------------<  151<H>  4.0   |  49<HH>  |  1.0    Ca    8.9      17 May 2018 23:56    TPro  6.4  /  Alb  3.6  /  TBili  <0.2  /  DBili  x   /  AST  19  /  ALT  15  /  AlkPhos  126<H>        PT/INR - ( 17 May 2018 23:56 )   PT: 14.20 sec;   INR: 1.31 ratio         PTT - ( 17 May 2018 23:56 )  PTT:33.2 sec                                       Urinalysis Basic - ( 17 May 2018 23:25 )    Color: Yellow / Appearance: Cloudy / S.010 / pH: x  Gluc: x / Ketone: Negative  / Bili: Negative / Urobili: 1.0 mg/dL   Blood: x / Protein: Negative mg/dL / Nitrite: Negative   Leuk Esterase: Small / RBC: x / WBC 3-5 /HPF   Sq Epi: x / Non Sq Epi: x / Bacteria: x        CARDIAC MARKERS ( 17 May 2018 23:56 )  x     / <0.01 ng/mL / 33 U/L / x     / x                                                LIVER FUNCTIONS - ( 17 May 2018 23:56 )  Alb: 3.6 g/dL / Pro: 6.4 g/dL / ALK PHOS: 126 U/L / ALT: 15 U/L / AST: 19 U/L / GGT: x                                                                                                                                   ABG - ( 18 May 2018 00:58 )  pH, Arterial: 7.36  pH, Blood: x     /  pCO2: 88    /  pO2: 87    / HCO3: 50    / Base Excess: 20.3  /  SaO2: 96          X-Rays   CT Scans                       INTERPRETATION: CLINICAL STATEMENT: Shortness of breath. History of DVT.     TECHNIQUE: Multislice helical sections were obtained from the thoracic inlet   to the lung bases during rapid administration of 100cc Optiray 320   intravenous contrast using a CTA protocol. Subsequently, axial CT sections   were obtained from the domes of the diaphragms to the pubic symphysis. Thin   sections were reconstructed through the pulmonary vasculature.     COMPARISON CT: CT chest performed 2017.     OTHER STUDIES USED FOR CORRELATION: None.       FINDINGS:     CHEST:     PULMONARY EMBOLUS: No pulmonary embolus.     LUNGS/PLEURA/AIRWAYS: Status post resection of the right upper lobe with   associated volume loss and elevation of the right hemidiaphragm. Bilateral   areas of subsegmental atelectasis. Stable stenosis of the right main   bronchus just distal to its origin. Centrilobular and paraseptal emphysema.   Biapical pleural thickening. No focal consolidations, pneumothorax, or   pleural effusions.     MEDIASTINUM/THORACIC NODES: Stable small mediastinal lymph nodes which do   not meet CT criteria for lymphadenopathy.     HEART/GREAT VESSELS: The heart is within normal limits. No pericardial   effusion. The ascending thoracic aorta measures upper limits of normal at   3.9-4.0 cm. Coronary artery calcifications..       ABDOMEN/PELVIS:     HEPATOBILIARY: Unremarkable.     SPLEEN: Unremarkable.     PANCREAS: Unremarkable.     ADRENAL GLANDS: Unremarkable.     KIDNEYS: Symmetric renal enhancement. No hydronephrosis.     ABDOMINOPELVIC NODES: Unremarkable.     PELVIC ORGANS: The urinary bladder is decompressed around a Garcia catheter.   Streak artifact from right hip arthroplasty limits evaluation of the pelvis.     PERITONEUM/MESENTERY/BOWEL: No bowel obstruction. No pneumoperitoneum or   ascites. Unremarkable appendix.     BONES/SOFT TISSUES: Status post right total hip arthroplasty. Osteopenia.   Degenerative changes of the thoracolumbar spine.     OTHER: Atherosclerotic disease of aorta.       IMPRESSION:     No pulmonary embolism.     No acute pathology within the chest, abdomen, or pelvis.                                                                        MEDICATIONS  (STANDING):  ALBUTerol    90 MICROgram(s) HFA Inhaler 1 Puff(s) Inhalation every 4 hours  ALBUTerol/ipratropium for Nebulization 3 milliLiter(s) Nebulizer every 6 hours  cefepime   IVPB 1000 milliGRAM(s) IV Intermittent every 12 hours  methylPREDNISolone sodium succinate Injectable 60 milliGRAM(s) IV Push every 8 hours  pantoprazole  Injectable 40 milliGRAM(s) IV Push daily  vancomycin  IVPB 750 milliGRAM(s) IV Intermittent every 12 hours    MEDICATIONS  (PRN):

## 2018-05-19 NOTE — PROGRESS NOTE ADULT - ASSESSMENT
IMPRESSION:    AHRF  COPD exacerbation  Chronic hypercapnic respiratory failure  HO NSCLC SP resection  KENNY not compliant with CPAP therapy     PLAN:    CNS: Avoid sedative meds.     HEENT: Oral care,    PULMONARY:  HOB @ 45 degrees, solumedrol 60 q 8, duonebs q4 plus prn, change to avaps 4 hours on and 4 hours off and during sleep.  Repeat ABG now     CARDIOVASCULAR: I=O    GI: GI prophylaxis.  Feeding if she tolerated off NIV.     RENAL:  Follow up lytes.  Correct as needed    INFECTIOUS DISEASE: Follow up cultures. Rocephin / Azithromycin for now    HEMATOLOGICAL:  DVT prophylaxis.    ENDOCRINE:  Follow up FS.  Insulin protocol if needed.    MICU monitoring for now

## 2018-05-19 NOTE — PROGRESS NOTE ADULT - ASSESSMENT
Assessment:  A 78y  Female who presents for     Plan:  1.    2.    3. Assessment:  A 78y Female with a PMH of COPD (on 3.5 L home O2), KENNY (on CPAP) who presents for     Plan:  1.    2.    3. Assessment:  A 78y Female with a PMH of COPD (on 3.5 L home O2), KENNY (on CPAP), lung CA s/p RUL lobectomy (2014), DVT (on Xarelto), hypothyroidism, urinary retention with chronic indwelling urinary catheter who presented with 1-day history of generalized weakness, chills, and increased sputum production. In the ED, the patient was noted to be hypoxic and was started on BiPAP.    Plan:  1. Acute on chronic hypercapnic/hypoxic respiratory failure in the setting of COPD and KENNY  - CT chest (5/18): no PE; B/L areas of subsegmental atelectasis; centrilobular and paraseptal emphysema; no focal consolidations, PTX, or pleural effusions  - continue AVAPS and maintain SpO2 at 88-92%  - continue IV steroids, nebulizers, and antibiotics  - blood cultures pending  - f/u repeat ABGs    2. History of DVT  - continue Xarelto    3. Hypothyroidism  - continue Synthroid    4. History of urinary retention with chronic indwelling catheter  - f/u U/A and culture    5. DVT and GI prophylaxis    6. Disposition  - Full Code Assessment:  A 78y Female with a PMH of COPD (on 3.5 L home O2), KENNY (on CPAP), lung CA s/p RUL lobectomy (2014), DVT (on Xarelto), hypothyroidism, urinary retention with chronic indwelling urinary catheter who presented with 1-day history of generalized weakness, chills, and increased sputum production. In the ED, the patient was noted to be hypoxic and was started on BiPAP.    Plan:  1. Acute on chronic hypercapnic/hypoxic respiratory failure in the setting of COPD and KENNY  - CT chest (5/18): no PE; B/L areas of subsegmental atelectasis; centrilobular and paraseptal emphysema; no focal consolidations, PTX, or pleural effusions  - continue AVAPS and maintain SpO2 at 88-92%  - continue IV steroids, nebulizers, and antibiotics  - blood cultures pending  - f/u repeat ABGs    2. Hyperglycemia  - likely related to steroid effect  - continue insulin regimen and monitor FS    3. History of DVT  - continue Xarelto    4. Hypothyroidism  - continue Synthroid    5. History of urinary retention with chronic indwelling catheter  - f/u U/A and culture    6. DVT and GI prophylaxis    7. Disposition  - Full Code

## 2018-05-19 NOTE — PROGRESS NOTE ADULT - SUBJECTIVE AND OBJECTIVE BOX
Over Night Events:none        ROS:  See HPI    PHYSICAL EXAM    ICU Vital Signs Last 24 Hrs  T(C): 36.7 (19 May 2018 05:40), Max: 36.7 (19 May 2018 05:40)  T(F): 98 (19 May 2018 05:40), Max: 98 (19 May 2018 05:40)  HR: 86 (19 May 2018 08:00) (61 - 97)  BP: 140/81 (19 May 2018 08:00) (100/57 - 144/71)  BP(mean): 105 (19 May 2018 08:00) (74 - 105)  ABP: --  ABP(mean): --  RR: 17 (19 May 2018 08:00) (17 - 40)  SpO2: 100% (19 May 2018 08:00) (91% - 100%)        18 @ 07:01  -  18 @ 07:00  --------------------------------------------------------  IN: 500 mL / OUT: 1930 mL / NET: -1430 mL    18 @ 07:01  -  18 @ 08:56  --------------------------------------------------------  IN: 0 mL / OUT: 225 mL / NET: -225 mL        General:  HEENT:                Lymph Nodes: NO cervical LN   Lungs: Bilateral BS  Cardiovascular: Regular   Abdomen: Soft, Positive BS  Extremities: No clubbing   Skin:   Neurological:       LABS:                          9.9    5.95  )-----------( 308      ( 19 May 2018 05:44 )             29.9                                               05-19    137  |  84<L>  |  25<H>  ----------------------------<  255<H>  3.6   |  39<H>  |  0.9    Ca    8.4<L>      19 May 2018 05:44  Phos  3.3     -19  Mg     1.5         TPro  6.2  /  Alb  3.7  /  TBili  <0.2  /  DBili  <0.2  /  AST  20  /  ALT  20  /  AlkPhos  113        PT/INR - ( 17 May 2018 23:56 )   PT: 14.20 sec;   INR: 1.31 ratio         PTT - ( 19 May 2018 05:44 )  PTT:32.8 sec                                       Urinalysis Basic - ( 17 May 2018 23:25 )    Color: Yellow / Appearance: Cloudy / S.010 / pH: x  Gluc: x / Ketone: Negative  / Bili: Negative / Urobili: 1.0 mg/dL   Blood: x / Protein: Negative mg/dL / Nitrite: Negative   Leuk Esterase: Small / RBC: x / WBC 3-5 /HPF   Sq Epi: x / Non Sq Epi: x / Bacteria: x        CARDIAC MARKERS ( 17 May 2018 23:56 )  x     / <0.01 ng/mL / 33 U/L / x     / x                                                LIVER FUNCTIONS - ( 19 May 2018 05:44 )  Alb: 3.7 g/dL / Pro: 6.2 g/dL / ALK PHOS: 113 U/L / ALT: 20 U/L / AST: 20 U/L / GGT: x                                                  Culture - Blood (collected 17 May 2018 23:25)  Source: .Blood Blood  Preliminary Report (19 May 2018 06:01):    No growth to date.    Culture - Blood (collected 17 May 2018 23:25)  Source: .Blood Blood  Preliminary Report (19 May 2018 06:01):    No growth to date.                                                                                       ABG - ( 18 May 2018 08:05 )  pH, Arterial: 7.38  pH, Blood: x     /  pCO2: 89    /  pO2: 86    / HCO3: 52    / Base Excess: 22.4  /  SaO2: 97                  MEDICATIONS  (STANDING):  ALBUTerol    90 MICROgram(s) HFA Inhaler 1 Puff(s) Inhalation every 4 hours  ALBUTerol/ipratropium for Nebulization 3 milliLiter(s) Nebulizer every 6 hours  azithromycin  IVPB 500 milliGRAM(s) IV Intermittent every 24 hours  cefTRIAXone   IVPB 1 Gram(s) IV Intermittent every 24 hours  dextrose 50% Injectable 12.5 Gram(s) IV Push once  dextrose 50% Injectable 25 Gram(s) IV Push once  dextrose 50% Injectable 25 Gram(s) IV Push once  insulin glargine Injectable (LANTUS) 20 Unit(s) SubCutaneous at bedtime  insulin lispro (HumaLOG) corrective regimen sliding scale   SubCutaneous three times a day before meals  insulin lispro Injectable (HumaLOG) 6 Unit(s) SubCutaneous three times a day before meals  levothyroxine 50 MICROGram(s) Oral daily  methylPREDNISolone sodium succinate Injectable 60 milliGRAM(s) IV Push every 8 hours  pantoprazole  Injectable 40 milliGRAM(s) IV Push daily  rivaroxaban 20 milliGRAM(s) Oral every 24 hours  sodium chloride 0.9%. 1000 milliLiter(s) (50 mL/Hr) IV Continuous <Continuous>    MEDICATIONS  (PRN):  dextrose 40% Gel 15 Gram(s) Oral once PRN Blood Glucose LESS THAN 70 milliGRAM(s)/deciliter  glucagon  Injectable 1 milliGRAM(s) IntraMuscular once PRN Glucose LESS THAN 70 milligrams/deciliter      Xrays:                                                                                     ECHO

## 2018-05-19 NOTE — PROGRESS NOTE ADULT - SUBJECTIVE AND OBJECTIVE BOX
Patient is a 78y old Female who presented with a chief complaint of Weakness, fatigue, chills, increased sputum production (18 May 2018 06:27)    PAST MEDICAL & SURGICAL HISTORY:  Urinary retention  Lung cancer  DVT, lower extremity  Hypothyroidism  KENNY on CPAP  Chronic obstructive pulmonary disease, unspecified COPD type  History of hip surgery  S/P knee surgery  S/P lobectomy of lung    MEDICATIONS  (STANDING):  ALBUTerol    90 MICROgram(s) HFA Inhaler 1 Puff(s) Inhalation every 4 hours  ALBUTerol/ipratropium for Nebulization 3 milliLiter(s) Nebulizer every 6 hours  azithromycin  IVPB 500 milliGRAM(s) IV Intermittent every 24 hours  cefTRIAXone   IVPB 1 Gram(s) IV Intermittent every 24 hours  methylPREDNISolone sodium succinate Injectable 60 milliGRAM(s) IV Push every 8 hours  pantoprazole  Injectable 40 milliGRAM(s) IV Push daily  rivaroxaban 20 milliGRAM(s) Oral every 24 hours  sodium chloride 0.9%. 1000 milliLiter(s) (50 mL/Hr) IV Continuous <Continuous>    Allergy: Cipro (Unknown)    Overnight events:    Vital Signs Last 24 Hrs  T(C): 36.4 (19 May 2018 00:25), Max: 37.1 (18 May 2018 06:30)  T(F): 97.5 (19 May 2018 00:25), Max: 98.7 (18 May 2018 06:30)  HR: 72 (19 May 2018 04:00) (61 - 97)  BP: 113/61 (19 May 2018 04:00) (100/57 - 144/71)  BP(mean): 86 (19 May 2018 00:25) (74 - 86)  RR: 40 (19 May 2018 04:00) (18 - 40)  SpO2: 95% (19 May 2018 04:00) (91% - 100%)  CAPILLARY BLOOD GLUCOSE  262 (19 May 2018 04:00)  335 (18 May 2018 21:00)    I&O's Summary    17 May 2018 07:01  -  18 May 2018 07:00  --------------------------------------------------------  IN: 0 mL / OUT: 1500 mL / NET: -1500 mL    18 May 2018 07:01  -  19 May 2018 04:35  --------------------------------------------------------  IN: 400 mL / OUT: 1765 mL / NET: -1365 mL    CENTRAL LINE: [ ] YES [X] NO     TUBES: None    ESCOBAR: [ ] YES [X] NO        Physical Exam:    -     General :     -      HEENT:    -      Cardiac:    -      Pulm:    -      GI:    -      Musculoskeletal:    -      Neuro:    Labs:                        10.9   8.32  )-----------( 322      ( 18 May 2018 11:35 )             33.3             05-    137  |  83<L>  |  25<H>  ----------------------------<  180<H>  4.3   |  48<HH>  |  1.0    Ca    8.7      18 May 2018 11:35  Mg     1.4         TPro  6.4  /  Alb  3.6  /  TBili  <0.2  /  DBili  x   /  AST  19  /  ALT  15  /  AlkPhos  126<H>  -17    LIVER FUNCTIONS - ( 17 May 2018 23:56 )  Alb: 3.6 g/dL / Pro: 6.4 g/dL / ALK PHOS: 126 U/L / ALT: 15 U/L / AST: 19 U/L / GGT: x         PT/INR - ( 17 May 2018 23:56 )   PT: 14.20 sec;   INR: 1.31 ratio    PTT - ( 17 May 2018 23:56 )  PTT:33.2 sec  CARDIAC MARKERS ( 17 May 2018 23:56 )  x     / <0.01 ng/mL / 33 U/L / x     / x        ABG - ( 18 May 2018 08:05 )  pH, Arterial: 7.38  pH, Blood: x     /  pCO2: 89    /  pO2: 86    / HCO3: 52    / Base Excess: 22.4  /  SaO2: 97        Urinalysis Basic - ( 17 May 2018 23:25 )    Color: Yellow / Appearance: Cloudy / S.010 / pH: x  Gluc: x / Ketone: Negative  / Bili: Negative / Urobili: 1.0 mg/dL   Blood: x / Protein: Negative mg/dL / Nitrite: Negative   Leuk Esterase: Small / RBC: x / WBC 3-5 /HPF   Sq Epi: x / Non Sq Epi: x / Bacteria: x Patient is a 78y old Female who presented with a chief complaint of Weakness, fatigue, chills, increased sputum production (18 May 2018 06:27)    PAST MEDICAL & SURGICAL HISTORY:  Urinary retention  Lung cancer  DVT, lower extremity  Hypothyroidism  KENNY on CPAP  Chronic obstructive pulmonary disease, unspecified COPD type  History of hip surgery  S/P knee surgery  S/P lobectomy of lung    MEDICATIONS  (STANDING):  ALBUTerol    90 MICROgram(s) HFA Inhaler 1 Puff(s) Inhalation every 4 hours  ALBUTerol/ipratropium for Nebulization 3 milliLiter(s) Nebulizer every 6 hours  azithromycin  IVPB 500 milliGRAM(s) IV Intermittent every 24 hours  cefTRIAXone   IVPB 1 Gram(s) IV Intermittent every 24 hours  methylPREDNISolone sodium succinate Injectable 60 milliGRAM(s) IV Push every 8 hours  pantoprazole  Injectable 40 milliGRAM(s) IV Push daily  rivaroxaban 20 milliGRAM(s) Oral every 24 hours  sodium chloride 0.9%. 1000 milliLiter(s) (50 mL/Hr) IV Continuous <Continuous>    Allergy: Cipro (Unknown)    Overnight events: No acute events overnight.    Vital Signs Last 24 Hrs  T(C): 36.4 (19 May 2018 00:25), Max: 37.1 (18 May 2018 06:30)  T(F): 97.5 (19 May 2018 00:25), Max: 98.7 (18 May 2018 06:30)  HR: 72 (19 May 2018 04:00) (61 - 97)  BP: 113/61 (19 May 2018 04:00) (100/57 - 144/71)  BP(mean): 86 (19 May 2018 00:25) (74 - 86)  RR: 40 (19 May 2018 04:00) (18 - 40)  SpO2: 95% (19 May 2018 04:00) (91% - 100%)  CAPILLARY BLOOD GLUCOSE  262 (19 May 2018 04:00)  335 (18 May 2018 21:00)    I&O's Summary    17 May 2018 07:01  -  18 May 2018 07:00  --------------------------------------------------------  IN: 0 mL / OUT: 1500 mL / NET: -1500 mL    18 May 2018 07:01  -  19 May 2018 04:35  --------------------------------------------------------  IN: 400 mL / OUT: 1765 mL / NET: -1365 mL    CENTRAL LINE: [ ] YES [X] NO     TUBES: None    ESCOBAR: [ ] YES [X] NO        Physical Exam:    -     General : Alert, awake and in no acute distress    -      HEENT: NC    -      Cardiac: RRR    -      Pulm: minimal scattered wheezes bilaterally    -      GI: abdomen soft, non-tender      Labs:                        10.9   8.32  )-----------( 322      ( 18 May 2018 11:35 )             33.3             05-18    137  |  83<L>  |  25<H>  ----------------------------<  180<H>  4.3   |  48<HH>  |  1.0    Ca    8.7      18 May 2018 11:35  Mg     1.4         TPro  6.4  /  Alb  3.6  /  TBili  <0.2  /  DBili  x   /  AST  19  /  ALT  15  /  AlkPhos  126<H>  17    LIVER FUNCTIONS - ( 17 May 2018 23:56 )  Alb: 3.6 g/dL / Pro: 6.4 g/dL / ALK PHOS: 126 U/L / ALT: 15 U/L / AST: 19 U/L / GGT: x         PT/INR - ( 17 May 2018 23:56 )   PT: 14.20 sec;   INR: 1.31 ratio    PTT - ( 17 May 2018 23:56 )  PTT:33.2 sec  CARDIAC MARKERS ( 17 May 2018 23:56 )  x     / <0.01 ng/mL / 33 U/L / x     / x        ABG - ( 18 May 2018 08:05 )  pH, Arterial: 7.38  pH, Blood: x     /  pCO2: 89    /  pO2: 86    / HCO3: 52    / Base Excess: 22.4  /  SaO2: 97        Urinalysis Basic - ( 17 May 2018 23:25 )    Color: Yellow / Appearance: Cloudy / S.010 / pH: x  Gluc: x / Ketone: Negative  / Bili: Negative / Urobili: 1.0 mg/dL   Blood: x / Protein: Negative mg/dL / Nitrite: Negative   Leuk Esterase: Small / RBC: x / WBC 3-5 /HPF   Sq Epi: x / Non Sq Epi: x / Bacteria: x

## 2018-05-20 NOTE — PROGRESS NOTE ADULT - SUBJECTIVE AND OBJECTIVE BOX
Over Night Events:refused insulin          ROS:  See HPI    PHYSICAL EXAM    ICU Vital Signs Last 24 Hrs  T(C): 35.6 (20 May 2018 04:00), Max: 35.8 (19 May 2018 20:00)  T(F): 96 (20 May 2018 04:00), Max: 96.5 (19 May 2018 20:00)  HR: 68 (20 May 2018 06:00) (64 - 91)  BP: 145/84 (20 May 2018 06:00) (109/65 - 158/82)  BP(mean): 117 (20 May 2018 06:00) (77 - 121)  ABP: --  ABP(mean): --  RR: 20 (20 May 2018 06:00) (16 - 40)  SpO2: 95% (20 May 2018 06:00) (87% - 99%)        05-19-18 @ 07:01  -  05-20-18 @ 07:00  --------------------------------------------------------  IN: 700 mL / OUT: 2655 mL / NET: -1955 mL        General:  HEENT:                Lymph Nodes: NO cervical LN   Lungs: Bilateral BS  Cardiovascular: Regular   Abdomen: Soft, Positive BS  Extremities: No clubbing   Skin:   Neurological:       LABS:                          9.9    5.95  )-----------( 308      ( 19 May 2018 05:44 )             29.9                                               05-19    133<L>  |  82<L>  |  23<H>  ----------------------------<  227<H>  3.8   |  38<H>  |  0.9    Ca    8.7      19 May 2018 16:43  Phos  3.3     05-19  Mg     2.2     05-19    TPro  6.2  /  Alb  3.7  /  TBili  <0.2  /  DBili  <0.2  /  AST  20  /  ALT  20  /  AlkPhos  113  05-19      PTT - ( 19 May 2018 05:44 )  PTT:32.8 sec                                                                                     LIVER FUNCTIONS - ( 19 May 2018 05:44 )  Alb: 3.7 g/dL / Pro: 6.2 g/dL / ALK PHOS: 113 U/L / ALT: 20 U/L / AST: 20 U/L / GGT: x                                                  Culture - Urine (collected 17 May 2018 23:25)  Source: .Urine Clean Catch (Midstream)  Preliminary Report (19 May 2018 11:12):    >100,000 CFU/ml Pseudomonas aeruginosa    Culture - Blood (collected 17 May 2018 23:25)  Source: .Blood Blood  Preliminary Report (19 May 2018 06:01):    No growth to date.    Culture - Blood (collected 17 May 2018 23:25)  Source: .Blood Blood  Preliminary Report (19 May 2018 06:01):    No growth to date.                                                                                           MEDICATIONS  (STANDING):  ALBUTerol    90 MICROgram(s) HFA Inhaler 1 Puff(s) Inhalation every 4 hours  ALBUTerol/ipratropium for Nebulization 3 milliLiter(s) Nebulizer every 6 hours  azithromycin  IVPB 500 milliGRAM(s) IV Intermittent every 24 hours  cefTRIAXone   IVPB 1 Gram(s) IV Intermittent every 24 hours  dextrose 50% Injectable 12.5 Gram(s) IV Push once  dextrose 50% Injectable 25 Gram(s) IV Push once  dextrose 50% Injectable 25 Gram(s) IV Push once  insulin glargine Injectable (LANTUS) 20 Unit(s) SubCutaneous at bedtime  insulin lispro (HumaLOG) corrective regimen sliding scale   SubCutaneous three times a day before meals  insulin lispro Injectable (HumaLOG) 6 Unit(s) SubCutaneous three times a day before meals  levothyroxine 50 MICROGram(s) Oral daily  methylPREDNISolone sodium succinate Injectable 60 milliGRAM(s) IV Push daily  pantoprazole  Injectable 40 milliGRAM(s) IV Push daily  rivaroxaban 20 milliGRAM(s) Oral every 24 hours    MEDICATIONS  (PRN):  dextrose 40% Gel 15 Gram(s) Oral once PRN Blood Glucose LESS THAN 70 milliGRAM(s)/deciliter  glucagon  Injectable 1 milliGRAM(s) IntraMuscular once PRN Glucose LESS THAN 70 milligrams/deciliter      Xrays:  no chg                                                                                   ECHO

## 2018-05-21 NOTE — PROGRESS NOTE ADULT - SUBJECTIVE AND OBJECTIVE BOX
ICU DOWNGRADE NOTE:    78y Female transferred to floor from ICU    78y old Female p/w CC weakness, fatigue, chills, increased sputum production on /05/18/18.     The patient is currently admitted for the primary diagnosis of Respiratory distress    The patient was admitted to the unit for 4 Days.    The patient (was never) intubated for, and was never on pressors for.    Indwelling vascular catheters: none    Urinary Catheter: none    Disposition: downgrade to floor    Code Status: Full code    ICU COURSE OF EVENTS:  77 yo F with PMH of COPD on 3.5L O2 oxygen at home, KENNY on CPAP, Lung cancer s/p RUL lobectomy in 2014, hx of DVT on Xarelto, Hypothyroidism, Urinary retention with chronic Garcia Wheelchair bound presenting with above chief complaint. She reports that she started feeling weak today hence she came to the ED. She also is reporting increasing sputum production but no cough or chills. She was recently in the hospital 2 weeks ago for COPD exacerbation. In ED, patient was hypoxic placed on BIPAP desaturated to the 70's then improved. ABG showed increased CO2 of 80 and Increased Bicarb of 55 on ABG. CT scan of chest done and Cefepime vancomycin given. Admitted to ICU for furhter monitoring.  who is a pulmonologist refused intubation for now. (18 May 2018 06:27). Antibiotics adjusted to azithromycin, ceftriaxone. Pt has been maintaining O2 sats in high 90s on 3.5L ventimask, as pt does not like NC.     -------------------------------------------------------------------------------------------        -------------------------------------------------------------------------------------------    Current workup in progress:    SIGN OUT AT 05-21-18 @ 15:37 GIVEN TO: ICU DOWNGRADE NOTE:    78y Female transferred to floor from ICU    78y old Female p/w CC weakness, fatigue, chills, increased sputum production on /05/18/18.     The patient is currently admitted for the primary diagnosis of Respiratory distress    The patient was admitted to the unit for 4 Days.    The patient (was never) intubated for, and was never on pressors for.    Indwelling vascular catheters: none    Urinary Catheter: +chronic love    Disposition: downgrade to floor    Code Status: Full code    ICU COURSE OF EVENTS:  77 yo F with PMH of COPD on 3.5L O2 oxygen at home, KENNY on CPAP, Lung cancer s/p RUL lobectomy in 2014, hx of DVT on Xarelto, Hypothyroidism, Urinary retention with chronic Love Wheelchair bound presenting with above chief complaint. She reports that she started feeling weak today hence she came to the ED. She also is reporting increasing sputum production but no cough or chills. She was recently in the hospital 2 weeks ago for COPD exacerbation. In ED, patient was hypoxic placed on BIPAP desaturated to the 70's then improved. ABG showed increased CO2 of 80 and Increased Bicarb of 55 on ABG. CT scan of chest done and Cefepime vancomycin given. Admitted to ICU for furhter monitoring.  who is a pulmonologist refused intubation for now. (18 May 2018 06:27). Antibiotics adjusted to azithromycin, ceftriaxone. Pt has been maintaining O2 sats in high 90s on 3.5L ventimask, as pt does not like NC.     -------------------------------------------------------------------------------------------        -------------------------------------------------------------------------------------------    Current workup in progress:    SIGN OUT AT 05-21-18 @ 15:37 GIVEN TO: ICU DOWNGRADE NOTE:    78y Female transferred to floor from ICU    78y old Female p/w CC weakness, fatigue, chills, increased sputum production on /05/18/18.     The patient is currently admitted for the primary diagnosis of Respiratory distress    The patient was admitted to the unit for 4 Days.    The patient (was never) intubated for, and was never on pressors for.    Indwelling vascular catheters: none    Urinary Catheter: +chronic love    Disposition: downgrade to floor    Code Status: Full code    ICU COURSE OF EVENTS:  77 yo F with PMH of COPD on 3.5L O2 oxygen at home, KENNY on CPAP, Lung cancer s/p RUL lobectomy in 2014, hx of DVT on Xarelto, Hypothyroidism, Urinary retention with chronic Love Wheelchair bound presenting with above chief complaint. She reports that she started feeling weak today hence she came to the ED. She also is reporting increasing sputum production but no cough or chills. She was recently in the hospital 2 weeks ago for COPD exacerbation. In ED, patient was hypoxic placed on BIPAP desaturated to the 70's then improved. ABG showed increased CO2 of 80 and Increased Bicarb of 55 on ABG. CT scan of chest done and Cefepime vancomycin given. Admitted to ICU for furhter monitoring.  who is a pulmonologist refused intubation for now. (18 May 2018 06:27). Antibiotics adjusted to azithromycin, ceftriaxone. Pt has been maintaining O2 sats in high 90s on 3.5L ventimask, as pt does not like NC.     -------------------------------------------------------------------------------------------        -------------------------------------------------------------------------------------------    Current workup in progress:    SIGN OUT AT 05-21-18 @ [id number] GIVEN TO: dr keys

## 2018-05-21 NOTE — PROGRESS NOTE ADULT - SUBJECTIVE AND OBJECTIVE BOX
OVERNIGHT EVENTS: bipap for 2 h, fm, no events overnight, refuse avaps    Vital Signs Last 24 Hrs  T(C): 36.1 (21 May 2018 08:00), Max: 36.3 (20 May 2018 12:00)  T(F): 96.9 (21 May 2018 08:00), Max: 97.4 (20 May 2018 12:00)  HR: 69 (21 May 2018 08:00) (64 - 93)  BP: 127/72 (21 May 2018 08:00) (96/53 - 156/68)  BP(mean): 93 (21 May 2018 08:00) (69 - 100)  RR: 18 (21 May 2018 08:00) (11 - 41)  SpO2: 92% (21 May 2018 08:00) (92% - 100%)    PHYSICAL EXAMINATION:    GENERAL: AXO 3    HEENT: Head is normocephalic and atraumatic. Extraocular muscles are intact. Mucous membranes are moist.    NECK: Supple.    LUNGS: B/L RHONCHI    HEART: Regular rate and rhythm without murmur.    ABDOMEN: Soft, nontender, and nondistended.      EXTREMITIES: Without any cyanosis, clubbing, rash, lesions or edema.    NEUROLOGIC: Grossly intact.    SKIN: No ulceration or induration present.      LABS:                        9.6    7.71  )-----------( 296      ( 21 May 2018 05:13 )             30.2     05-20    138  |  89<L>  |  22<H>  ----------------------------<  159<H>  4.5   |  35<H>  |  0.9    Ca    8.6      20 May 2018 13:44  Mg     2.2     05-19    TPro  6.5  /  Alb  3.8  /  TBili  <0.2  /  DBili  x   /  AST  18  /  ALT  20  /  AlkPhos  120<H>  05-20      Urinalysis Basic - ( 20 May 2018 00:18 )    Color: Yellow / Appearance: Clear / SG: <=1.005 / pH: x  Gluc: x / Ketone: Negative  / Bili: Negative / Urobili: 0.2 mg/dL   Blood: x / Protein: Negative mg/dL / Nitrite: Negative   Leuk Esterase: Small / RBC: x / WBC 6-10 /HPF   Sq Epi: x / Non Sq Epi: Occasional /HPF / Bacteria: Few /HPF        CARDIAC MARKERS ( 21 May 2018 05:13 )  x     / x     / x     / x     / 1.6 ng/mL        Serum Pro-Brain Natriuretic Peptide: 240 pg/mL (05-18-18 @ 11:35)            05-20-18 @ 07:01  -  05-21-18 @ 07:00  --------------------------------------------------------  IN: 60 mL / OUT: 1500 mL / NET: -1440 mL    05-21-18 @ 07:01  -  05-21-18 @ 09:29  --------------------------------------------------------  IN: 250 mL / OUT: 65 mL / NET: 185 mL        MICROBIOLOGY:  Culture Results:   No growth to date. (05-19 @ 05:44)  Culture Results:   No growth to date. (05-17 @ 23:25)  Culture Results:   No growth to date. (05-17 @ 23:25)  Culture Results:   >100,000 CFU/ml Pseudomonas aeruginosa (05-17 @ 23:25)      MEDICATIONS  (STANDING):  ALBUTerol    90 MICROgram(s) HFA Inhaler 1 Puff(s) Inhalation every 4 hours  ALBUTerol/ipratropium for Nebulization 3 milliLiter(s) Nebulizer every 6 hours  azithromycin  IVPB 500 milliGRAM(s) IV Intermittent every 24 hours  cefTRIAXone   IVPB 1 Gram(s) IV Intermittent every 24 hours  dextrose 50% Injectable 12.5 Gram(s) IV Push once  dextrose 50% Injectable 25 Gram(s) IV Push once  dextrose 50% Injectable 25 Gram(s) IV Push once  insulin glargine Injectable (LANTUS) 20 Unit(s) SubCutaneous at bedtime  insulin lispro (HumaLOG) corrective regimen sliding scale   SubCutaneous three times a day before meals  insulin lispro Injectable (HumaLOG) 6 Unit(s) SubCutaneous three times a day before meals  levothyroxine 50 MICROGram(s) Oral daily  methylPREDNISolone sodium succinate Injectable 60 milliGRAM(s) IV Push daily  pantoprazole  Injectable 40 milliGRAM(s) IV Push daily  rivaroxaban 20 milliGRAM(s) Oral every 24 hours    MEDICATIONS  (PRN):  dextrose 40% Gel 15 Gram(s) Oral once PRN Blood Glucose LESS THAN 70 milliGRAM(s)/deciliter  glucagon  Injectable 1 milliGRAM(s) IntraMuscular once PRN Glucose LESS THAN 70 milligrams/deciliter      RADIOLOGY & ADDITIONAL STUDIES:

## 2018-05-21 NOTE — PROGRESS NOTE ADULT - SUBJECTIVE AND OBJECTIVE BOX
Patient Summary:  Patient is a 78y old  Female who presents with a chief complaint of Weakness, fatigue, chills, increased sputum production (18 May 2018 06:27)    HPI:  79 yo F with PMH of COPD on 3.5L O2 oxygen at home, KENNY on CPAP, Lung cancer s/p RUL lobectomy in 2014, hx of DVT on Xarelto, Hypothyroidism, Urinary retention with chronic Garcia Wheelchair bound presenting with above chief complaint. She reports that she started feeling weak today hence she came to the ED. She also is reporting increasing sputum production but no cough or chills. She was recently in the hospital 2 weeks ago for COPD exacerbation. In ED, patient was hypoxic placed on BIPAP desaturated to the 70's then improved. ABG showed increased CO2 of 80 and Increased Bicarb of 55 on ABG. CT scan of chest done and Cefepime vancomycin given. Admitted to ICU for furhter monitoring.  who is a pulmonologist refused intubation for now. (18 May 2018 06:27)      HEALTH ISSUES - PROBLEM Dx:        OVERNIGHT/DAY EVENTS:    CAPILLARY BLOOD GLUCOSE  139 (21 May 2018 08:00)  233 (20 May 2018 21:22)  169 (20 May 2018 16:00)  210 (20 May 2018 11:50)          VITAL SIGNS:  Vital Signs Last 24 Hrs  T(C): 36.1 (21 May 2018 08:00), Max: 36.3 (20 May 2018 12:00)  T(F): 96.9 (21 May 2018 08:00), Max: 97.4 (20 May 2018 12:00)  HR: 74 (21 May 2018 10:00) (64 - 93)  BP: 117/66 (21 May 2018 10:00) (96/53 - 156/68)  BP(mean): 80 (21 May 2018 10:00) (69 - 94)  RR: 16 (21 May 2018 10:00) (11 - 41)  SpO2: 92% (21 May 2018 10:00) (92% - 100%)      05-20 @ 07:01  -  05-21 @ 07:00  --------------------------------------------------------  IN: 60 mL / OUT: 1500 mL / NET: -1440 mL    05-21 @ 07:01  -  05-21 @ 11:40  --------------------------------------------------------  IN: 250 mL / OUT: 190 mL / NET: 60 mL        PHYSICAL EXAM:  General: NAD  HEENT: NC/AT; PERRL, clear conjunctiva  Neck: supple  Respiratory: b/l ronchi  Cardiovascular: +S1/S2; RRR  Abdomen: soft, NT/ND;   Extremities:  no LE edema  Skin: normal color and turgor; no rash  Neurological: A&Ox3, move all extremities. CN II-XII intact    LABS:                        9.6<L>  7.71  )-----------( 296      ( 05-21 @ 05:13 )             30.2<L>                          11.0<L>  12.18<H> )-----------( 338      ( 05-20 @ 13:44 )             34.5<L>      139  |  92<L>  |  25<H>  ----------------------------<  140<H>  05-21 @ 05:13  4.6   |  42<HH>  |  0.8      138  |  89<L>  |  22<H>  ----------------------------<  159<H>  05-20 @ 13:44  4.5   |  35<H>  |  0.9        Ca    8.3<L>   Ca    8.6   Mg     2.1   Mg     2.2     TPro  6.5  /  Alb  3.8  /  TBili  <0.2  /  DBili  x   /  AST  18  /  ALT  20  /  AlkPhos  120<H>      Alb: 3.8 g/dL / Pro: 6.5 g/dL / ALK PHOS: 120 U/L / ALT: 20 U/L / AST: 18 U/L / GGT: x               Culture - Blood (collected 19 May 2018 05:44)  Source: .Blood None  Preliminary Report (20 May 2018 16:35):    No growth to date.      Urinalysis Basic - ( 20 May 2018 00:18 )    Color: Yellow / Appearance: Clear / SG: <=1.005 / pH: x  Gluc: x / Ketone: Negative  / Bili: Negative / Urobili: 0.2 mg/dL   Blood: x / Protein: Negative mg/dL / Nitrite: Negative   Leuk Esterase: Small / RBC: x / WBC 6-10 /HPF   Sq Epi: x / Non Sq Epi: Occasional /HPF / Bacteria: Few /HPF      CARDIAC MARKERS ( 21 May 2018 05:13 )  x     / x     / x     / x     / 1.6 ng/mL        RADIOLOGY & ADDITIONAL TESTS:    MEDICATIONS  (STANDING):  ALBUTerol    90 MICROgram(s) HFA Inhaler 1 Puff(s) Inhalation every 4 hours  ALBUTerol/ipratropium for Nebulization 3 milliLiter(s) Nebulizer every 6 hours  azithromycin  IVPB 500 milliGRAM(s) IV Intermittent every 24 hours  cefTRIAXone   IVPB 1 Gram(s) IV Intermittent every 24 hours  dextrose 50% Injectable 12.5 Gram(s) IV Push once  dextrose 50% Injectable 25 Gram(s) IV Push once  dextrose 50% Injectable 25 Gram(s) IV Push once  insulin glargine Injectable (LANTUS) 20 Unit(s) SubCutaneous at bedtime  insulin lispro (HumaLOG) corrective regimen sliding scale   SubCutaneous three times a day before meals  insulin lispro Injectable (HumaLOG) 6 Unit(s) SubCutaneous three times a day before meals  levothyroxine 50 MICROGram(s) Oral daily  methylPREDNISolone sodium succinate Injectable 60 milliGRAM(s) IV Push daily  pantoprazole  Injectable 40 milliGRAM(s) IV Push daily  rivaroxaban 20 milliGRAM(s) Oral every 24 hours    MEDICATIONS  (PRN):  dextrose 40% Gel 15 Gram(s) Oral once PRN Blood Glucose LESS THAN 70 milliGRAM(s)/deciliter  glucagon  Injectable 1 milliGRAM(s) IntraMuscular once PRN Glucose LESS THAN 70 milligrams/deciliter      PAST MEDICAL & SURGICAL HISTORY:  Urinary retention  Lung cancer  DVT, lower extremity  Hypothyroidism  KENNY on CPAP  Chronic obstructive pulmonary disease, unspecified COPD type  History of hip surgery  S/P knee surgery  S/P lobectomy of lung      Allergies    Cipro (Unknown)        ASSESSMENT & PLAN:  COPD exacerbation, acute on chronic respiratory failure  -for downgrade to floor today    Neuro:      CVS:      Pulm:  -iv steroids, nebulizers, abx  -f/u blood cx    GI:      /Renal:      Heme:  -hx dvt cw xarelto      Endo:  -c/w insulin regiment, monitor fs likely 2/2 steroid effect  -hx hypothyroidism, cw synthroid      ID:      Other:    FULL CODE Patient Summary:  Patient is a 78y old  Female who presents with a chief complaint of Weakness, fatigue, chills, increased sputum production (18 May 2018 06:27)    HPI:  77 yo F with PMH of COPD on 3.5L O2 oxygen at home, KENNY on CPAP, Lung cancer s/p RUL lobectomy in 2014, hx of DVT on Xarelto, Hypothyroidism, Urinary retention with chronic Garcia Wheelchair bound presenting with above chief complaint. She reports that she started feeling weak today hence she came to the ED. She also is reporting increasing sputum production but no cough or chills. She was recently in the hospital 2 weeks ago for COPD exacerbation. In ED, patient was hypoxic placed on BIPAP desaturated to the 70's then improved. ABG showed increased CO2 of 80 and Increased Bicarb of 55 on ABG. CT scan of chest done and Cefepime vancomycin given. Admitted to ICU for furhter monitoring.  who is a pulmonologist refused intubation for now. (18 May 2018 06:27)      HEALTH ISSUES - PROBLEM Dx:        OVERNIGHT/DAY EVENTS:    CAPILLARY BLOOD GLUCOSE  139 (21 May 2018 08:00)  233 (20 May 2018 21:22)  169 (20 May 2018 16:00)  210 (20 May 2018 11:50)          VITAL SIGNS:  Vital Signs Last 24 Hrs  T(C): 36.1 (21 May 2018 08:00), Max: 36.3 (20 May 2018 12:00)  T(F): 96.9 (21 May 2018 08:00), Max: 97.4 (20 May 2018 12:00)  HR: 74 (21 May 2018 10:00) (64 - 93)  BP: 117/66 (21 May 2018 10:00) (96/53 - 156/68)  BP(mean): 80 (21 May 2018 10:00) (69 - 94)  RR: 16 (21 May 2018 10:00) (11 - 41)  SpO2: 92% (21 May 2018 10:00) (92% - 100%)      05-20 @ 07:01  -  05-21 @ 07:00  --------------------------------------------------------  IN: 60 mL / OUT: 1500 mL / NET: -1440 mL    05-21 @ 07:01  -  05-21 @ 11:40  --------------------------------------------------------  IN: 250 mL / OUT: 190 mL / NET: 60 mL        PHYSICAL EXAM:  General: NAD  HEENT: NC/AT; PERRL, clear conjunctiva  Neck: supple  Respiratory: b/l ronchi  Cardiovascular: +S1/S2; RRR  Abdomen: soft, NT/ND;   Extremities:  no LE edema  Skin: normal color and turgor; no rash  Neurological: A&Ox3, move all extremities. CN II-XII intact    LABS:                        9.6<L>  7.71  )-----------( 296      ( 05-21 @ 05:13 )             30.2<L>                          11.0<L>  12.18<H> )-----------( 338      ( 05-20 @ 13:44 )             34.5<L>      139  |  92<L>  |  25<H>  ----------------------------<  140<H>  05-21 @ 05:13  4.6   |  42<HH>  |  0.8      138  |  89<L>  |  22<H>  ----------------------------<  159<H>  05-20 @ 13:44  4.5   |  35<H>  |  0.9        Ca    8.3<L>   Ca    8.6   Mg     2.1   Mg     2.2     TPro  6.5  /  Alb  3.8  /  TBili  <0.2  /  DBili  x   /  AST  18  /  ALT  20  /  AlkPhos  120<H>      Alb: 3.8 g/dL / Pro: 6.5 g/dL / ALK PHOS: 120 U/L / ALT: 20 U/L / AST: 18 U/L / GGT: x               Culture - Blood (collected 19 May 2018 05:44)  Source: .Blood None  Preliminary Report (20 May 2018 16:35):    No growth to date.      Urinalysis Basic - ( 20 May 2018 00:18 )    Color: Yellow / Appearance: Clear / SG: <=1.005 / pH: x  Gluc: x / Ketone: Negative  / Bili: Negative / Urobili: 0.2 mg/dL   Blood: x / Protein: Negative mg/dL / Nitrite: Negative   Leuk Esterase: Small / RBC: x / WBC 6-10 /HPF   Sq Epi: x / Non Sq Epi: Occasional /HPF / Bacteria: Few /HPF      CARDIAC MARKERS ( 21 May 2018 05:13 )  x     / x     / x     / x     / 1.6 ng/mL        RADIOLOGY & ADDITIONAL TESTS:    MEDICATIONS  (STANDING):  ALBUTerol    90 MICROgram(s) HFA Inhaler 1 Puff(s) Inhalation every 4 hours  ALBUTerol/ipratropium for Nebulization 3 milliLiter(s) Nebulizer every 6 hours  azithromycin  IVPB 500 milliGRAM(s) IV Intermittent every 24 hours  cefTRIAXone   IVPB 1 Gram(s) IV Intermittent every 24 hours  dextrose 50% Injectable 12.5 Gram(s) IV Push once  dextrose 50% Injectable 25 Gram(s) IV Push once  dextrose 50% Injectable 25 Gram(s) IV Push once  insulin glargine Injectable (LANTUS) 20 Unit(s) SubCutaneous at bedtime  insulin lispro (HumaLOG) corrective regimen sliding scale   SubCutaneous three times a day before meals  insulin lispro Injectable (HumaLOG) 6 Unit(s) SubCutaneous three times a day before meals  levothyroxine 50 MICROGram(s) Oral daily  methylPREDNISolone sodium succinate Injectable 60 milliGRAM(s) IV Push daily  pantoprazole  Injectable 40 milliGRAM(s) IV Push daily  rivaroxaban 20 milliGRAM(s) Oral every 24 hours    MEDICATIONS  (PRN):  dextrose 40% Gel 15 Gram(s) Oral once PRN Blood Glucose LESS THAN 70 milliGRAM(s)/deciliter  glucagon  Injectable 1 milliGRAM(s) IntraMuscular once PRN Glucose LESS THAN 70 milligrams/deciliter      PAST MEDICAL & SURGICAL HISTORY:  Urinary retention  Lung cancer  DVT, lower extremity  Hypothyroidism  KENNY on CPAP  Chronic obstructive pulmonary disease, unspecified COPD type  History of hip surgery  S/P knee surgery  S/P lobectomy of lung      Allergies    Cipro (Unknown)        ASSESSMENT & PLAN:  COPD exacerbation, acute on chronic respiratory failure  -for downgrade to floor today    Neuro:      CVS:      Pulm:  -iv steroids, nebulizers, abx  -f/u blood cx    GI:      /Renal:      Heme:  -hx dvt cw xarelto      Endo:  -c/w insulin regiment, monitor fs likely 2/2 steroid effect  -hx hypothyroidism, cw synthroid      ID:      Other:    FULL CODE    5/21 11:52 spoke to , plan for downgrade to floor today

## 2018-05-21 NOTE — PROGRESS NOTE ADULT - ASSESSMENT
COPD  EXACERBATION, FEELS BETTER, NOT COMPLIANT    - TRANSFER TO FLOOR  - IV STEROIDS/ ABX  - OOB TO CHAIR  - XARELTO  - POOR PROGNOSIS

## 2018-05-22 NOTE — PROGRESS NOTE ADULT - ASSESSMENT
Assessment:  A 78y Female with a PMH of COPD (on 3.5 L home O2), KENNY (on CPAP), lung CA s/p RUL lobectomy (2014), DVT (on Xarelto), hypothyroidism, urinary retention with chronic indwelling urinary catheter who presented with 1-day history of generalized weakness, chills, and increased sputum production. In the ED, the patient was noted to be hypoxic. An ABG done at the time revealed a PCO2 of 88, PO2 of 87, and HCO3 of 50. She was started on BiPAP.    Plan:  1. Acute on chronic hypercapnic/hypoxic respiratory failure in the setting of COPD and KENNY  - CT chest (5/18): no PE; B/L areas of subsegmental atelectasis; centrilobular and paraseptal emphysema; no focal consolidations, PTX, or pleural effusions  - CXR (5/21): stable elevation of the right hemidiaphragm and bibasilar opacities  - maintain SpO2 at 88-92%  - continue IV steroids, nebulizers, and antibiotics (Azithromycin and Rocephin)  - blood cultures negative to date    2. Hyperglycemia  - likely related to steroid effect  - continue insulin regimen and monitor FS    3. History of DVT  - continue Xarelto    4. Hypothyroidism  - continue Synthroid    5. History of urinary retention with chronic indwelling catheter  - urine culture (5/20/18): 10,000-49,000 CFU/mL P. aeruginosa  - blood cultures negative to date    6. DVT and GI prophylaxis    7. Disposition  - Full Code Assessment:  A 78y Female with a PMH of COPD (on 3.5 L home O2), KENNY (on CPAP), lung CA s/p RUL lobectomy (2014), DVT (on Xarelto), hypothyroidism, urinary retention with chronic indwelling urinary catheter who presented with 1-day history of generalized weakness, chills, and increased sputum production. In the ED, the patient was noted to be hypoxic. An ABG done at the time revealed a PCO2 of 88, PO2 of 87, and HCO3 of 50. She was started on BiPAP.    Plan:  1. Acute on chronic hypercapnic/hypoxic respiratory failure in the setting of COPD and KENNY  - CT chest (5/18): no PE; B/L areas of subsegmental atelectasis; centrilobular and paraseptal emphysema; no focal consolidations, PTX, or pleural effusions  - CXR (5/21): stable elevation of the right hemidiaphragm and bibasilar opacities  - maintain SpO2 at 88-92%  - continue IV steroids, nebulizers, and antibiotics (Azithromycin and Rocephin)  - blood cultures negative to date  - pulmonology following: recommend slow steroid taper; continue antibiotic course    2. Hyperglycemia  - likely related to steroid effect  - continue insulin regimen and monitor FS    3. History of DVT  - continue Xarelto    4. Hypothyroidism  - continue Synthroid    5. History of urinary retention with chronic indwelling catheter  - urine culture (5/20/18): 10,000-49,000 CFU/mL P. aeruginosa; most likely asymptomatic bacteriuria  - blood cultures negative to date    6. DVT and GI prophylaxis    7. Disposition  - Full Code  - PT/rehab evaluation pending Assessment:  A 78y Female with a PMH of COPD (on 3.5 L home O2), KENNY (on CPAP), lung CA s/p RUL lobectomy (2014), DVT (on Xarelto), hypothyroidism, urinary retention with chronic indwelling urinary catheter who presented with 1-day history of generalized weakness, chills, and increased sputum production. In the ED, the patient was noted to be hypoxic. An ABG done at the time revealed a PCO2 of 88, PO2 of 87, and HCO3 of 50. She was started on BiPAP.    Plan:  1. Acute on chronic hypercapnic/hypoxic respiratory failure in the setting of COPD and KENNY  - CT chest (5/18): no PE; B/L areas of subsegmental atelectasis; centrilobular and paraseptal emphysema; no focal consolidations, PTX, or pleural effusions  - CXR (5/21): stable elevation of the right hemidiaphragm and bibasilar opacities  - maintain SpO2 at 88-92%  - continue IV steroids, nebulizers, and antibiotics (Azithromycin and Rocephin)  - blood cultures negative to date  - pulmonology following: recommend slow steroid taper; continue antibiotic course    2. Hyperglycemia  - likely related to steroid effect  - continue insulin regimen and monitor FS    3. History of DVT  - continue Xarelto    4. Hypothyroidism  - continue Synthroid    5. History of urinary retention with chronic indwelling catheter  - urine culture (5/20/18): 10,000-49,000 CFU/mL P. aeruginosa; most likely asymptomatic bacteriuria  - blood cultures negative to date    6. DVT and GI prophylaxis    7. Disposition  - Full Code  - PT/rehab evaluation: STR at SNF vs. home with VNS

## 2018-05-22 NOTE — PROVIDER CONTACT NOTE (MEDICATION) - SITUATION
md made aware while pt was receiving iv azithromycin c/o burning. site inspected and flush with ns. denied pain while using flush.

## 2018-05-22 NOTE — PROGRESS NOTE ADULT - ASSESSMENT
COPD  EXACERBATION, FEELS BETTER, NOT COMPLIANT    - STEROIDS TAPER SLOWLY  - FINISH COURSE OF ABX  - SPIRIVA/ SYMBICORT  - REHAB EVAL  - ALL OVER POOR PROGNOSIS

## 2018-05-22 NOTE — PROGRESS NOTE ADULT - SUBJECTIVE AND OBJECTIVE BOX
OVERNIGHT EVENTS: feels better, sitting on chair, occ cough    Vital Signs Last 24 Hrs  T(C): 35.8 (22 May 2018 04:47), Max: 36.1 (21 May 2018 12:00)  T(F): 96.5 (22 May 2018 04:47), Max: 96.9 (21 May 2018 12:00)  HR: 75 (22 May 2018 04:47) (69 - 104)  BP: 98/54 (22 May 2018 04:47) (98/54 - 161/75)  BP(mean): 105 (21 May 2018 17:55) (80 - 116)  RR: 20 (22 May 2018 04:47) (16 - 25)  SpO2: 90% (21 May 2018 20:30) (90% - 94%)    PHYSICAL EXAMINATION:    GENERAL: The patient is awake and alert in no apparent distress.     HEENT: Head is normocephalic and atraumatic. Extraocular muscles are intact. Mucous membranes are moist.    NECK: Supple.    LUNGS: dec bs both bases/ b/l rhonchi    HEART: Regular rate and rhythm without murmur.    ABDOMEN: Soft, nontender, and nondistended.      EXTREMITIES: Without any cyanosis, clubbing, rash, lesions or edema.    NEUROLOGIC: Grossly intact.    SKIN: No ulceration or induration present.      LABS:                        9.5    12.31 )-----------( 315      ( 22 May 2018 06:05 )             30.0     05-22    139  |  94<L>  |  22<H>  ----------------------------<  84  4.4   |  36<H>  |  0.8    Ca    8.5      22 May 2018 06:05  Mg     2.1     05-22    TPro  6.5  /  Alb  3.8  /  TBili  <0.2  /  DBili  x   /  AST  18  /  ALT  20  /  AlkPhos  120<H>  05-20          CARDIAC MARKERS ( 21 May 2018 05:13 )  x     / x     / x     / x     / 1.6 ng/mL                  05-21-18 @ 07:01  -  05-22-18 @ 07:00  --------------------------------------------------------  IN: 600 mL / OUT: 2195 mL / NET: -1595 mL        MICROBIOLOGY:  Culture Results:   10,000 - 49,000 CFU/mL Pseudomonas aeruginosa  Normal Urogenital donavan present (05-20 @ 00:18)  Culture Results:   No growth to date. (05-19 @ 05:44)      MEDICATIONS  (STANDING):  ALBUTerol    90 MICROgram(s) HFA Inhaler 1 Puff(s) Inhalation every 4 hours  ALBUTerol/ipratropium for Nebulization 3 milliLiter(s) Nebulizer every 6 hours  azithromycin  IVPB 500 milliGRAM(s) IV Intermittent every 24 hours  cefTRIAXone   IVPB 1 Gram(s) IV Intermittent every 24 hours  dextrose 50% Injectable 12.5 Gram(s) IV Push once  dextrose 50% Injectable 25 Gram(s) IV Push once  dextrose 50% Injectable 25 Gram(s) IV Push once  insulin glargine Injectable (LANTUS) 20 Unit(s) SubCutaneous at bedtime  insulin lispro (HumaLOG) corrective regimen sliding scale   SubCutaneous three times a day before meals  insulin lispro Injectable (HumaLOG) 6 Unit(s) SubCutaneous three times a day before meals  levothyroxine 50 MICROGram(s) Oral daily  methylPREDNISolone sodium succinate Injectable 60 milliGRAM(s) IV Push daily  pantoprazole    Tablet 40 milliGRAM(s) Oral before breakfast  rivaroxaban 20 milliGRAM(s) Oral every 24 hours    MEDICATIONS  (PRN):  dextrose 40% Gel 15 Gram(s) Oral once PRN Blood Glucose LESS THAN 70 milliGRAM(s)/deciliter  glucagon  Injectable 1 milliGRAM(s) IntraMuscular once PRN Glucose LESS THAN 70 milligrams/deciliter      RADIOLOGY & ADDITIONAL STUDIES:

## 2018-05-22 NOTE — CONSULT NOTE ADULT - SUBJECTIVE AND OBJECTIVE BOX
HPI:  79 yo F with PMH of COPD on 3.5L O2 oxygen at home, KENNY on CPAP, Lung cancer s/p RUL lobectomy in 2014, hx of DVT on Xarelto, Hypothyroidism, Urinary retention with chronic Garcia Wheelchair bound presenting with above chief complaint. She reports that she started feeling weak today hence she came to the ED. She also is reporting increasing sputum production but no cough or chills. She was recently in the hospital 2 weeks ago for COPD exacerbation. In ED, patient was hypoxic placed on BIPAP desaturated to the 70's then improved. ABG showed increased CO2 of 80 and Increased Bicarb of 55 on ABG. CT scan of chest done and Cefepime vancomycin given. Admitted to ICU for furhter monitoring.  who is a pulmonologist refused intubation for now. (18 May 2018 06:27)      PAST MEDICAL & SURGICAL HISTORY:  Urinary retention  Lung cancer  DVT, lower extremity  Hypothyroidism  KENNY on CPAP  Chronic obstructive pulmonary disease, unspecified COPD type  History of hip surgery  S/P knee surgery  S/P lobectomy of lung      Hospital Course:    TODAY'S SUBJECTIVE & REVIEW OF SYMPTOMS:     Constitutional WNL   Cardio WNL   Resp sob   GI WNL  Heme WNL  Endo WNL  Skin WNL  MSK WNL  Neuro WNL  Cognitive WNL  Psych WNL      MEDICATIONS  (STANDING):  ALBUTerol    90 MICROgram(s) HFA Inhaler 1 Puff(s) Inhalation every 4 hours  ALBUTerol/ipratropium for Nebulization 3 milliLiter(s) Nebulizer every 6 hours  azithromycin  IVPB 500 milliGRAM(s) IV Intermittent every 24 hours  cefTRIAXone   IVPB 1 Gram(s) IV Intermittent every 24 hours  dextrose 50% Injectable 12.5 Gram(s) IV Push once  dextrose 50% Injectable 25 Gram(s) IV Push once  dextrose 50% Injectable 25 Gram(s) IV Push once  insulin glargine Injectable (LANTUS) 20 Unit(s) SubCutaneous at bedtime  insulin lispro (HumaLOG) corrective regimen sliding scale   SubCutaneous three times a day before meals  insulin lispro Injectable (HumaLOG) 6 Unit(s) SubCutaneous three times a day before meals  levothyroxine 50 MICROGram(s) Oral daily  methylPREDNISolone sodium succinate Injectable 60 milliGRAM(s) IV Push daily  nystatin Powder 1 Application(s) Topical two times a day  pantoprazole    Tablet 40 milliGRAM(s) Oral before breakfast  rivaroxaban 20 milliGRAM(s) Oral every 24 hours    MEDICATIONS  (PRN):  dextrose 40% Gel 15 Gram(s) Oral once PRN Blood Glucose LESS THAN 70 milliGRAM(s)/deciliter  glucagon  Injectable 1 milliGRAM(s) IntraMuscular once PRN Glucose LESS THAN 70 milligrams/deciliter      FAMILY HISTORY:  No pertinent family history      Allergies    Cipro (Unknown)    Intolerances        SOCIAL HISTORY:    [  ] Etoh  [  ] Smoking  [  ] Substance abuse     Home Environment:  [  ] Home Alone  [ x ] Lives with Family  [  ] Home Health Aid    Dwelling:  [  ] Apartment  [x  ] Private House  [  ] Adult Home  [  ] Skilled Nursing Facility      [  ] Short Term  [  ] Long Term  [x  ] Stairs       Elevator [  ]    FUNCTIONAL STATUS PTA: (Check all that apply)  Ambulation: [ x  ]Independent    [  ] Dependent     [  ] Non-Ambulatory  Assistive Device: [  ] SA Cane  [  ]  Q Cane  [  ] Walker  [  ]  Wheelchair  ADL : [ x ] Independent  [  ]  Dependent       Vital Signs Last 24 Hrs  T(C): 36.4 (22 May 2018 14:14), Max: 36.4 (22 May 2018 14:14)  T(F): 97.5 (22 May 2018 14:14), Max: 97.5 (22 May 2018 14:14)  HR: 88 (22 May 2018 14:14) (70 - 104)  BP: 117/68 (22 May 2018 14:14) (98/54 - 161/75)  BP(mean): 105 (21 May 2018 17:55) (105 - 106)  RR: 18 (22 May 2018 14:14) (18 - 25)  SpO2: 90% (21 May 2018 20:30) (90% - 93%)      PHYSICAL EXAM: Alert & Oriented X3  GENERAL: NAD, well-groomed, well-developed  HEAD:  Atraumatic, Normocephalic  CHEST/LUNG: decreased bs james lung bases  HEART: S1S2+  ABDOMEN: Soft, Nontender  EXTREMITIES:  no calf tenderness    NERVOUS SYSTEM:  Cranial Nerves 2-12 intact [  ] Abnormal  [  ]  ROM: WFL all extremities [x  ]  Abnormal [  ]  Motor Strength: WFL all extremities  [x  ]  Abnormal [  ]  Sensation: intact to light touch [x  ] Abnormal [  ]  Reflexes: Symmetric [  ]  Abnormal [  ]    FUNCTIONAL STATUS:  Bed Mobility: Independent [  ]  Supervision [  ]  Needs Assistance [ x ]  N/A [  ]  Transfers: Independent [  ]  Supervision [  ]  Needs Assistance [x  ]  N/A [  ]   Ambulation: Independent [  ]  Supervision [  ]  Needs Assistance [  ]  N/A [  ]  ADL: Independent [  ] Requires Assistance [  ] N/A [  ]      LABS:                        9.5    12.31 )-----------( 315      ( 22 May 2018 06:05 )             30.0     05-22    139  |  94<L>  |  22<H>  ----------------------------<  84  4.4   |  36<H>  |  0.8    Ca    8.5      22 May 2018 06:05  Mg     2.1     05-22            RADIOLOGY & ADDITIONAL STUDIES:    Assesment:

## 2018-05-22 NOTE — PROGRESS NOTE ADULT - ASSESSMENT
79yo F with Past Medical History COPD on 3.5L O2 oxygen at home, KENNY on CPAP, Lung cancer s/p RUL lobectomy in 2014, hx of DVT on Xarelto, Hypothyroidism, Urinary retention with chronic Love, and wheelchair bound at baseline admitted for worsening fatigue and sputum production secondary to acute COPD exacerbation.     Acute on chronic hypercapnic/hypoxic respiratory failure in the setting of COPD and KENNY: CT Chest was negative for pulmonary embolism, though it confirmed significant emphysema.  Pulmonary follow-up appreciated; continue supportive care with IV Solumedrol, IV Azithromycin & Rocephin, and nebulizers.  The patient has not been able to tolerate NIPPV.  Blood cultures have been negative.  Wean off supplemental oxygen as tolerated.  Hyperglycemia: likely steroid induced.  Monitor FS with meals; add insulin coverage if FS remain elevated.  History of DVT: continue Xarelto  Hypothyroidism: continue Synthroid  Hx of urinary retention with chronic indwelling love: Urine cultures were positive for Pseudomonas.  The patient appears asymptomatic with no signs of active infection.  GI prophylaxis

## 2018-05-22 NOTE — PROGRESS NOTE ADULT - SUBJECTIVE AND OBJECTIVE BOX
FLOR MCCORMICK [id number]    Hospitalist Note  77yo F with Past Medical History COPD on 3.5L O2 oxygen at home, KENNY on CPAP, Lung cancer s/p RUL lobectomy in 2014, hx of DVT on Xarelto, Hypothyroidism, Urinary retention with chronic Garcia, and wheelchair bound at baseline admitted for worsening fatigue and sputum production secondary to acute COPD exacerbation.     Overnight events/Updates: The patient was initially admitted to the MICU for close monitoring due to acute respiratory failure secondary to COPD exacerbation.  She was never intubated, and responded to treatment with IV steroids and NIPPV (AVAPs).    Vital Signs Last 24 Hrs  T(C): 35.8 (22 May 2018 04:47), Max: 36.1 (21 May 2018 16:00)  T(F): 96.5 (22 May 2018 04:47), Max: 96.9 (21 May 2018 16:00)  HR: 75 (22 May 2018 04:47) (70 - 104)  BP: 98/54 (22 May 2018 04:47) (98/54 - 161/75)  BP(mean): 105 (21 May 2018 17:55) (105 - 116)  RR: 20 (22 May 2018 04:47) (20 - 25)  SpO2: 90% (21 May 2018 20:30) (90% - 93%)    Physical Examination:  General: AAO x 3  HEENT: PERRLA, EOMI, NC  CV= S1 & S2 appreciated  Lungs=coarse breath sounds without wheezing  Abdominal Examination= + BS, Obese, soft, NT  Extremity Examination= 1-2+ edema, No C/C    ROS: No chest pain, no shortness of breath.  All other systems reviewed and are within normal limits except for the complaints in the HPI.    MEDICATIONS  (STANDING):  ALBUTerol    90 MICROgram(s) HFA Inhaler 1 Puff(s) Inhalation every 4 hours  ALBUTerol/ipratropium for Nebulization 3 milliLiter(s) Nebulizer every 6 hours  azithromycin  IVPB 500 milliGRAM(s) IV Intermittent every 24 hours  cefTRIAXone   IVPB 1 Gram(s) IV Intermittent every 24 hours  dextrose 50% Injectable 12.5 Gram(s) IV Push once  dextrose 50% Injectable 25 Gram(s) IV Push once  dextrose 50% Injectable 25 Gram(s) IV Push once  insulin glargine Injectable (LANTUS) 20 Unit(s) SubCutaneous at bedtime  insulin lispro (HumaLOG) corrective regimen sliding scale   SubCutaneous three times a day before meals  insulin lispro Injectable (HumaLOG) 6 Unit(s) SubCutaneous three times a day before meals  levothyroxine 50 MICROGram(s) Oral daily  methylPREDNISolone sodium succinate Injectable 60 milliGRAM(s) IV Push daily  nystatin Powder 1 Application(s) Topical two times a day  pantoprazole    Tablet 40 milliGRAM(s) Oral before breakfast  rivaroxaban 20 milliGRAM(s) Oral every 24 hours    MEDICATIONS  (PRN):  dextrose 40% Gel 15 Gram(s) Oral once PRN Blood Glucose LESS THAN 70 milliGRAM(s)/deciliter  glucagon  Injectable 1 milliGRAM(s) IntraMuscular once PRN Glucose LESS THAN 70 milligrams/deciliter                            9.5    12.31 )-----------( 315      ( 22 May 2018 06:05 )             30.0     05-22    139  |  94<L>  |  22<H>  ----------------------------<  84  4.4   |  36<H>  |  0.8    Ca    8.5      22 May 2018 06:05  Mg     2.1     05-22    TPro  6.5  /  Alb  3.8  /  TBili  <0.2  /  DBili  x   /  AST  18  /  ALT  20  /  AlkPhos  120<H>  05-20      Case discussed with housestaff & family  TAMARA Burton [phone]

## 2018-05-22 NOTE — CONSULT NOTE ADULT - ASSESSMENT
IMPRESSION: Rehab of gait dysfunction      PRECAUTIONS: [  ] Cardiac  [  ] Respiratory  [  ] Seizures [  ] Contact Isolation  [  ] Droplet Isolation  [  ] Other    Weight Bearing Status:     RECOMMENDATION:    Out of Bed to Chair     DVT/Decubiti Prophylaxis    REHAB PLAN:     [ x  ] Bedside P/T 3-5 times a week   [   ]   Bedside O/T  2-3 times a week             [   ] No Rehab Therapy Indicated                   [   ]  Speech Therapy   Conditioning/ROM                                    ADL  Bed Mobility                                               Conditioning/ROM  Transfers                                                     Bed Mobility  Sitting /Standing Balance                         Transfers                                        Gait Training                                               Sitting/Standing Balance  Stair Training [   ]Applicable                    Home equipment Eval                                                                        Splinting  [   ] Only      GOALS:   ADL   [ x  ]   Independent                    Transfers  [ x  ] Independent                          Ambulation  [ x  ] Independent     [ x   ] With device                            [   ]  CG                                                         [   ]  CG                                                                  [   ] CG                            [    ] Min A                                                   [   ] Min A                                                              [   ] Min  A          DISCHARGE PLAN:   [   ]  Good candidate for Intensive Rehabilitation/Hospital based-[facility]A [facility]                                             Will tolerate 3hrs Intensive Rehab Daily                                       [  x  ]  Short Term Rehab in Skilled Nursing Facility                              vs         [ x   ]  Home with Outpatient or VN services                                         [    ]  Possible Candidate for Intensive Hospital based Rehab

## 2018-05-22 NOTE — PROGRESS NOTE ADULT - SUBJECTIVE AND OBJECTIVE BOX
Patient is a 78y old  Female who presented with a chief complaint of Weakness, fatigue, chills, increased sputum production (18 May 2018 06:27)    PAST MEDICAL & SURGICAL HISTORY:  Urinary retention  Lung cancer  DVT, lower extremity  Hypothyroidism  KENNY on CPAP  Chronic obstructive pulmonary disease, unspecified COPD type  History of hip surgery  S/P knee surgery  S/P lobectomy of lung    MEDICATIONS  (STANDING):  ALBUTerol    90 MICROgram(s) HFA Inhaler 1 Puff(s) Inhalation every 4 hours  ALBUTerol/ipratropium for Nebulization 3 milliLiter(s) Nebulizer every 6 hours  azithromycin  IVPB 500 milliGRAM(s) IV Intermittent every 24 hours  cefTRIAXone   IVPB 1 Gram(s) IV Intermittent every 24 hours  dextrose 50% Injectable 12.5 Gram(s) IV Push once  dextrose 50% Injectable 25 Gram(s) IV Push once  dextrose 50% Injectable 25 Gram(s) IV Push once  insulin glargine Injectable (LANTUS) 20 Unit(s) SubCutaneous at bedtime  insulin lispro (HumaLOG) corrective regimen sliding scale   SubCutaneous three times a day before meals  insulin lispro Injectable (HumaLOG) 6 Unit(s) SubCutaneous three times a day before meals  levothyroxine 50 MICROGram(s) Oral daily  methylPREDNISolone sodium succinate Injectable 60 milliGRAM(s) IV Push daily  pantoprazole  Injectable 40 milliGRAM(s) IV Push daily  rivaroxaban 20 milliGRAM(s) Oral every 24 hours    MEDICATIONS  (PRN):  dextrose 40% Gel 15 Gram(s) Oral once PRN Blood Glucose LESS THAN 70 milliGRAM(s)/deciliter  glucagon  Injectable 1 milliGRAM(s) IntraMuscular once PRN Glucose LESS THAN 70 milligrams/deciliter    Allergy: Cipro (Unknown)    Overnight events:    Vital Signs Last 24 Hrs  T(C): 35.8 (22 May 2018 04:47), Max: 36.1 (21 May 2018 08:00)  T(F): 96.5 (22 May 2018 04:47), Max: 96.9 (21 May 2018 08:00)  HR: 75 (22 May 2018 04:47) (69 - 104)  BP: 98/54 (22 May 2018 04:47) (98/54 - 161/75)  BP(mean): 105 (21 May 2018 17:55) (77 - 116)  RR: 20 (22 May 2018 04:47) (16 - 25)  SpO2: 90% (21 May 2018 20:30) (90% - 94%)  CAPILLARY BLOOD GLUCOSE  301 (21 May 2018 20:30)  233 (21 May 2018 16:00)  199 (21 May 2018 12:00)  139 (21 May 2018 08:00)    I&O's Summary    20 May 2018 07:01  -  21 May 2018 07:00  --------------------------------------------------------  IN: 60 mL / OUT: 1500 mL / NET: -1440 mL    21 May 2018 07:01  -  22 May 2018 05:53  --------------------------------------------------------  IN: 600 mL / OUT: 1445 mL / NET: -845 mL    CENTRAL LINE: [ ] YES [X] NO     TUBES: None    ESCOBAR: [ ] YES [X] NO        Physical Exam:    -     General :     -      HEENT:    -      Cardiac:    -      Pulm:    -      GI:    -      Musculoskeletal:    -      Neuro:    Labs:                        9.6    7.71  )-----------( 296      ( 21 May 2018 05:13 )             30.2             05-21    139  |  92<L>  |  25<H>  ----------------------------<  140<H>  4.6   |  42<HH>  |  0.8    Ca    8.3<L>      21 May 2018 05:13  Mg     2.1     05-21    TPro  6.5  /  Alb  3.8  /  TBili  <0.2  /  DBili  x   /  AST  18  /  ALT  20  /  AlkPhos  120<H>  05-20    LIVER FUNCTIONS - ( 20 May 2018 13:44 )  Alb: 3.8 g/dL / Pro: 6.5 g/dL / ALK PHOS: 120 U/L / ALT: 20 U/L / AST: 18 U/L / GGT: x                   CARDIAC MARKERS ( 21 May 2018 05:13 )  x     / x     / x     / x     / 1.6 ng/mL    Culture - Urine (collected 20 May 2018 00:18)  Source: .Urine Clean Catch (Midstream)  Preliminary Report (21 May 2018 16:20):    10,000 - 49,000 CFU/mL Pseudomonas aeruginosa    Normal Urogenital donavan present Patient is a 78y old  Female who presented with a chief complaint of Weakness, fatigue, chills, increased sputum production (18 May 2018 06:27)    PAST MEDICAL & SURGICAL HISTORY:  Urinary retention  Lung cancer  DVT, lower extremity  Hypothyroidism  KENNY on CPAP  Chronic obstructive pulmonary disease, unspecified COPD type  History of hip surgery  S/P knee surgery  S/P lobectomy of lung    MEDICATIONS  (STANDING):  ALBUTerol    90 MICROgram(s) HFA Inhaler 1 Puff(s) Inhalation every 4 hours  ALBUTerol/ipratropium for Nebulization 3 milliLiter(s) Nebulizer every 6 hours  azithromycin  IVPB 500 milliGRAM(s) IV Intermittent every 24 hours  cefTRIAXone   IVPB 1 Gram(s) IV Intermittent every 24 hours  dextrose 50% Injectable 12.5 Gram(s) IV Push once  dextrose 50% Injectable 25 Gram(s) IV Push once  dextrose 50% Injectable 25 Gram(s) IV Push once  insulin glargine Injectable (LANTUS) 20 Unit(s) SubCutaneous at bedtime  insulin lispro (HumaLOG) corrective regimen sliding scale   SubCutaneous three times a day before meals  insulin lispro Injectable (HumaLOG) 6 Unit(s) SubCutaneous three times a day before meals  levothyroxine 50 MICROGram(s) Oral daily  methylPREDNISolone sodium succinate Injectable 60 milliGRAM(s) IV Push daily  pantoprazole  Injectable 40 milliGRAM(s) IV Push daily  rivaroxaban 20 milliGRAM(s) Oral every 24 hours    MEDICATIONS  (PRN):  dextrose 40% Gel 15 Gram(s) Oral once PRN Blood Glucose LESS THAN 70 milliGRAM(s)/deciliter  glucagon  Injectable 1 milliGRAM(s) IntraMuscular once PRN Glucose LESS THAN 70 milligrams/deciliter    Allergy: Cipro (Unknown)    Overnight events:    Vital Signs Last 24 Hrs  T(C): 35.8 (22 May 2018 04:47), Max: 36.1 (21 May 2018 08:00)  T(F): 96.5 (22 May 2018 04:47), Max: 96.9 (21 May 2018 08:00)  HR: 75 (22 May 2018 04:47) (69 - 104)  BP: 98/54 (22 May 2018 04:47) (98/54 - 161/75)  BP(mean): 105 (21 May 2018 17:55) (77 - 116)  RR: 20 (22 May 2018 04:47) (16 - 25)  SpO2: 90% (21 May 2018 20:30) (90% - 94%)  CAPILLARY BLOOD GLUCOSE  301 (21 May 2018 20:30)  233 (21 May 2018 16:00)  199 (21 May 2018 12:00)  139 (21 May 2018 08:00)    I&O's Summary    20 May 2018 07:01  -  21 May 2018 07:00  --------------------------------------------------------  IN: 60 mL / OUT: 1500 mL / NET: -1440 mL    21 May 2018 07:01  -  22 May 2018 05:53  --------------------------------------------------------  IN: 600 mL / OUT: 1445 mL / NET: -845 mL    CENTRAL LINE: [ ] YES [X] NO     TUBES: None    ESCOBAR: [ ] YES [X] NO        Physical Exam:    -     General : Alert, awake and in no acute distress    -      HEENT: NC    -      Cardiac: RRR    -      Pulm: decreased breath sounds bilaterally; minimal scattered wheezes    -      GI: abdomen soft, non-tender    -      Musculoskeletal: non-pitting lower extremity edema bilaterally    -      Neuro: AAO x3    Labs:                        9.6    7.71  )-----------( 296      ( 21 May 2018 05:13 )             30.2             05-21    139  |  92<L>  |  25<H>  ----------------------------<  140<H>  4.6   |  42<HH>  |  0.8    Ca    8.3<L>      21 May 2018 05:13  Mg     2.1     05-21    TPro  6.5  /  Alb  3.8  /  TBili  <0.2  /  DBili  x   /  AST  18  /  ALT  20  /  AlkPhos  120<H>  05-20    LIVER FUNCTIONS - ( 20 May 2018 13:44 )  Alb: 3.8 g/dL / Pro: 6.5 g/dL / ALK PHOS: 120 U/L / ALT: 20 U/L / AST: 18 U/L / GGT: x                   CARDIAC MARKERS ( 21 May 2018 05:13 )  x     / x     / x     / x     / 1.6 ng/mL    Culture - Urine (collected 20 May 2018 00:18)  Source: .Urine Clean Catch (Midstream)  Preliminary Report (21 May 2018 16:20):    10,000 - 49,000 CFU/mL Pseudomonas aeruginosa    Normal Urogenital donavan present Patient is a 78y old  Female who presented with a chief complaint of Weakness, fatigue, chills, increased sputum production (18 May 2018 06:27)    PAST MEDICAL & SURGICAL HISTORY:  Urinary retention  Lung cancer  DVT, lower extremity  Hypothyroidism  KENNY on CPAP  Chronic obstructive pulmonary disease, unspecified COPD type  History of hip surgery  S/P knee surgery  S/P lobectomy of lung    MEDICATIONS  (STANDING):  ALBUTerol    90 MICROgram(s) HFA Inhaler 1 Puff(s) Inhalation every 4 hours  ALBUTerol/ipratropium for Nebulization 3 milliLiter(s) Nebulizer every 6 hours  azithromycin  IVPB 500 milliGRAM(s) IV Intermittent every 24 hours  cefTRIAXone   IVPB 1 Gram(s) IV Intermittent every 24 hours  dextrose 50% Injectable 12.5 Gram(s) IV Push once  dextrose 50% Injectable 25 Gram(s) IV Push once  dextrose 50% Injectable 25 Gram(s) IV Push once  insulin glargine Injectable (LANTUS) 20 Unit(s) SubCutaneous at bedtime  insulin lispro (HumaLOG) corrective regimen sliding scale   SubCutaneous three times a day before meals  insulin lispro Injectable (HumaLOG) 6 Unit(s) SubCutaneous three times a day before meals  levothyroxine 50 MICROGram(s) Oral daily  methylPREDNISolone sodium succinate Injectable 60 milliGRAM(s) IV Push daily  pantoprazole  Injectable 40 milliGRAM(s) IV Push daily  rivaroxaban 20 milliGRAM(s) Oral every 24 hours    MEDICATIONS  (PRN):  dextrose 40% Gel 15 Gram(s) Oral once PRN Blood Glucose LESS THAN 70 milliGRAM(s)/deciliter  glucagon  Injectable 1 milliGRAM(s) IntraMuscular once PRN Glucose LESS THAN 70 milligrams/deciliter    Allergy: Cipro (Unknown)    Overnight events: No acute events overnight.    Vital Signs Last 24 Hrs  T(C): 35.8 (22 May 2018 04:47), Max: 36.1 (21 May 2018 08:00)  T(F): 96.5 (22 May 2018 04:47), Max: 96.9 (21 May 2018 08:00)  HR: 75 (22 May 2018 04:47) (69 - 104)  BP: 98/54 (22 May 2018 04:47) (98/54 - 161/75)  BP(mean): 105 (21 May 2018 17:55) (77 - 116)  RR: 20 (22 May 2018 04:47) (16 - 25)  SpO2: 90% (21 May 2018 20:30) (90% - 94%)  CAPILLARY BLOOD GLUCOSE  301 (21 May 2018 20:30)  233 (21 May 2018 16:00)  199 (21 May 2018 12:00)  139 (21 May 2018 08:00)    I&O's Summary    20 May 2018 07:01  -  21 May 2018 07:00  --------------------------------------------------------  IN: 60 mL / OUT: 1500 mL / NET: -1440 mL    21 May 2018 07:01  -  22 May 2018 05:53  --------------------------------------------------------  IN: 600 mL / OUT: 1445 mL / NET: -845 mL    CENTRAL LINE: [ ] YES [X] NO     TUBES: None    ESCOBAR: [ ] YES [X] NO        Physical Exam:    -     General : Alert, awake and in no acute distress    -      HEENT: NC    -      Cardiac: RRR    -      Pulm: decreased breath sounds bilaterally; minimal scattered wheezes    -      GI: abdomen soft, non-tender    -      Musculoskeletal: non-pitting lower extremity edema bilaterally    -      Neuro: AAO x3    Labs:                        9.6    7.71  )-----------( 296      ( 21 May 2018 05:13 )             30.2             05-21    139  |  92<L>  |  25<H>  ----------------------------<  140<H>  4.6   |  42<HH>  |  0.8    Ca    8.3<L>      21 May 2018 05:13  Mg     2.1     05-21    TPro  6.5  /  Alb  3.8  /  TBili  <0.2  /  DBili  x   /  AST  18  /  ALT  20  /  AlkPhos  120<H>  05-20    LIVER FUNCTIONS - ( 20 May 2018 13:44 )  Alb: 3.8 g/dL / Pro: 6.5 g/dL / ALK PHOS: 120 U/L / ALT: 20 U/L / AST: 18 U/L / GGT: x                   CARDIAC MARKERS ( 21 May 2018 05:13 )  x     / x     / x     / x     / 1.6 ng/mL    Culture - Urine (collected 20 May 2018 00:18)  Source: .Urine Clean Catch (Midstream)  Preliminary Report (21 May 2018 16:20):    10,000 - 49,000 CFU/mL Pseudomonas aeruginosa    Normal Urogenital donavan present

## 2018-05-23 NOTE — PHYSICAL THERAPY INITIAL EVALUATION ADULT - GENERAL OBSERVATIONS, REHAB EVAL
PT IE 3-3:30pm.Pt sitting in NAD. +call bell, +chair alarm, +O2 3L via vent mask. No c/o pain, SOB, or dizziness.

## 2018-05-23 NOTE — DISCHARGE NOTE ADULT - CARE PROVIDER_API CALL
Alexander Dumont), Critical Care Medicine; Internal Medicine; Pulmonary Disease; Sleep Medicine  [address] [place] [address]  Phone: [phone]  Fax: [phone]

## 2018-05-23 NOTE — PROGRESS NOTE ADULT - SUBJECTIVE AND OBJECTIVE BOX
OVERNIGHT EVENTS: still on FM FEELS Back to her baseline    Vital Signs Last 24 Hrs  T(C): 36 (23 May 2018 17:27), Max: 36.3 (22 May 2018 20:56)  T(F): 96.8 (23 May 2018 17:27), Max: 97.3 (22 May 2018 20:56)  HR: 78 (23 May 2018 17:27) (73 - 97)  BP: 119/57 (23 May 2018 17:27) (106/58 - 119/57)  BP(mean): --  RR: 18 (23 May 2018 17:27) (17 - 18)  SpO2: 97% (22 May 2018 20:07) (97% - 97%)    PHYSICAL EXAMINATION:    GENERAL: The patient is awake and alert in no apparent distress.     HEENT: Head is normocephalic and atraumatic. Extraocular muscles are intact. Mucous membranes are moist.    NECK: Supple.    LUNGS: b/l crackles    HEART: Regular rate and rhythm without murmur.    ABDOMEN: Soft, nontender, and nondistended.      EXTREMITIES: Without any cyanosis, clubbing, rash, lesions or edema.    NEUROLOGIC: Grossly intact.    SKIN: No ulceration or induration present.      LABS:                        9.5    12.31 )-----------( 315      ( 22 May 2018 06:05 )             30.0     05-22    139  |  94<L>  |  22<H>  ----------------------------<  84  4.4   |  36<H>  |  0.8    Ca    8.5      22 May 2018 06:05  Mg     2.1     05-22 05-22-18 @ 07:01  -  05-23-18 @ 07:00  --------------------------------------------------------  IN: 0 mL / OUT: 1470 mL / NET: -1470 mL    05-23-18 @ 07:01  -  05-23-18 @ 18:30  --------------------------------------------------------  IN: 0 mL / OUT: 400 mL / NET: -400 mL        MICROBIOLOGY:  Culture Results:   10,000 - 49,000 CFU/mL Pseudomonas aeruginosa  Normal Urogenital donavan present (05-20 @ 00:18)      MEDICATIONS  (STANDING):  ALBUTerol    90 MICROgram(s) HFA Inhaler 1 Puff(s) Inhalation every 4 hours  ALBUTerol/ipratropium for Nebulization 3 milliLiter(s) Nebulizer every 6 hours  dextrose 50% Injectable 12.5 Gram(s) IV Push once  dextrose 50% Injectable 25 Gram(s) IV Push once  dextrose 50% Injectable 25 Gram(s) IV Push once  insulin glargine Injectable (LANTUS) 20 Unit(s) SubCutaneous at bedtime  insulin lispro (HumaLOG) corrective regimen sliding scale   SubCutaneous three times a day before meals  insulin lispro Injectable (HumaLOG) 6 Unit(s) SubCutaneous three times a day before meals  levothyroxine 50 MICROGram(s) Oral daily  nystatin Powder 1 Application(s) Topical two times a day  pantoprazole    Tablet 40 milliGRAM(s) Oral before breakfast  predniSONE   Tablet 60 milliGRAM(s) Oral daily  rivaroxaban 20 milliGRAM(s) Oral every 24 hours    MEDICATIONS  (PRN):  dextrose 40% Gel 15 Gram(s) Oral once PRN Blood Glucose LESS THAN 70 milliGRAM(s)/deciliter  glucagon  Injectable 1 milliGRAM(s) IntraMuscular once PRN Glucose LESS THAN 70 milligrams/deciliter      RADIOLOGY & ADDITIONAL STUDIES:

## 2018-05-23 NOTE — DISCHARGE NOTE ADULT - HOSPITAL COURSE
79yo F with Past Medical History COPD on 3.5L O2 oxygen at home, KENNY on CPAP, Lung cancer s/p RUL lobectomy in 2014, hx of DVT on Xarelto, Hypothyroidism, Urinary retention with chronic Garcia, and wheelchair bound at baseline admitted for worsening fatigue and sputum production secondary to acute COPD exacerbation. CT Chest was negative for pulmonary embolism, though it confirmed significant emphysema. The patient was admitted to CCU for close monitoring. She was treated with IV steroids, IV antibiotics, and non-invasive ventilation. She was downgraded to the medical floor. Throughout her hospital course, the patient was noted to be non-compliant with medical management including refusal of medications, blood sugar monitoring, and refusal of wearing nasal cannula for oxygen therapy. She continued to be evaluated by pulmonology and her respiratory status improved during her hospitalization. She is currently tolerating her baseline oxygen therapy requirements. The patient was evaluated by physiatry who recommended short-term rehab vs. home with [facility]. The patient is requesting to be discharged home. She was advised to follow-up with her primary doctors and pulmonologist for further evaluation.

## 2018-05-23 NOTE — DISCHARGE NOTE ADULT - MEDICATION SUMMARY - MEDICATIONS TO TAKE
I will START or STAY ON the medications listed below when I get home from the hospital:    Deltasone 20 mg oral tablet  -- 3 tab(s) by mouth once a day x 3 days  2 tab(s) by mouth once a day x 3 days  1 tab(s) by mouth once a day x 3 days  -- Indication: For COPD exacerbation    rivaroxaban 20 mg oral tablet  -- 1 tab(s) by mouth every 24 hours  -- Indication: For DVT, lower extremity    albuterol 90 mcg/inh inhalation aerosol  -- 1 puff(s) inhaled every 4 hours  -- Indication: For COPD    nystatin 100,000 units/g topical powder  -- 1 application on skin 2 times a day until healing is complete for fungal rash  -- Indication: For Anti-fungal    Bumex 1 mg oral tablet  -- 1 tab(s) by mouth 2 times a day  -- Indication: For Diuretic    azithromycin 500 mg oral tablet  -- 1 tab(s) by mouth once a day  -- Indication: For Pneumonia    Protonix 40 mg oral delayed release tablet  -- 1 tab(s) by mouth once a day  -- Indication: For GERD    Synthroid 50 mcg (0.05 mg) oral tablet  -- 1 tab(s) by mouth once a day  -- Indication: For Hypothyroidism

## 2018-05-23 NOTE — PHYSICAL THERAPY INITIAL EVALUATION ADULT - LEVEL OF INDEPENDENCE: SIT/STAND, REHAB EVAL
dependent (less than 25% patients effort)/Despite max cues for safe sequencing pt unable to follow commands effectively to complete transfer.

## 2018-05-23 NOTE — PROGRESS NOTE ADULT - SUBJECTIVE AND OBJECTIVE BOX
FLOR MCCORMICK [id number]    Hospitalist Note  77yo F with Past Medical History COPD on 3.5L O2 oxygen at home, KENNY on CPAP, Lung cancer s/p RUL lobectomy in 2014, hx of DVT on Xarelto, Hypothyroidism, Urinary retention with chronic Garcia, and wheelchair bound at baseline admitted for worsening fatigue and sputum production secondary to acute COPD exacerbation.     Overnight events/Updates: status post MICU downgrade.  The patient has been poorly compliant with nursing and physicians.  Her oxygenation has improved from admission though she refuses to switch from facemask to canula.    Vital Signs Last 24 Hrs  T(C): 36 (23 May 2018 13:09), Max: 36.3 (22 May 2018 20:56)  T(F): 96.8 (23 May 2018 13:09), Max: 97.3 (22 May 2018 20:56)  HR: 73 (23 May 2018 13:09) (73 - 97)  BP: 106/58 (23 May 2018 13:09) (106/58 - 118/59)  BP(mean): --  RR: 17 (23 May 2018 13:09) (17 - 18)  SpO2: 97% (22 May 2018 20:07) (97% - 97%)    Physical Examination:  General: AAO x 3  HEENT: PERRLA, EOMI  CV= S1 & S2 appreciated  Lungs=left basilar crackle, no wheezes  Abdominal Examination= + BS, Soft, NT/ND  Extremity Examination= No C/C/E    ROS: No chest pain, no shortness of breath.  All other systems reviewed and are within normal limits except for the complaints in the HPI.    MEDICATIONS  (STANDING):  ALBUTerol    90 MICROgram(s) HFA Inhaler 1 Puff(s) Inhalation every 4 hours  ALBUTerol/ipratropium for Nebulization 3 milliLiter(s) Nebulizer every 6 hours  dextrose 50% Injectable 12.5 Gram(s) IV Push once  dextrose 50% Injectable 25 Gram(s) IV Push once  dextrose 50% Injectable 25 Gram(s) IV Push once  insulin glargine Injectable (LANTUS) 20 Unit(s) SubCutaneous at bedtime  insulin lispro (HumaLOG) corrective regimen sliding scale   SubCutaneous three times a day before meals  insulin lispro Injectable (HumaLOG) 6 Unit(s) SubCutaneous three times a day before meals  levothyroxine 50 MICROGram(s) Oral daily  nystatin Powder 1 Application(s) Topical two times a day  pantoprazole    Tablet 40 milliGRAM(s) Oral before breakfast  predniSONE   Tablet 60 milliGRAM(s) Oral daily  rivaroxaban 20 milliGRAM(s) Oral every 24 hours    MEDICATIONS  (PRN):  dextrose 40% Gel 15 Gram(s) Oral once PRN Blood Glucose LESS THAN 70 milliGRAM(s)/deciliter  glucagon  Injectable 1 milliGRAM(s) IntraMuscular once PRN Glucose LESS THAN 70 milligrams/deciliter                            9.5    12.31 )-----------( 315      ( 22 May 2018 06:05 )             30.0     05-22    139  |  94<L>  |  22<H>  ----------------------------<  84  4.4   |  36<H>  |  0.8    Ca    8.5      22 May 2018 06:05  Mg     2.1     05-22        Case discussed with housestaff & family  TAMARA Burton [id number]

## 2018-05-23 NOTE — PROGRESS NOTE ADULT - PROVIDER SPECIALTY LIST ADULT
CCU
CCU
Critical Care
Hospitalist
Hospitalist
Internal Medicine
Pulmonology
CCU

## 2018-05-23 NOTE — DISCHARGE NOTE ADULT - PATIENT PORTAL LINK FT
You can access the [facility] Patient Portal, offered by [facility], by registering with the following website: [email or web address]

## 2018-05-23 NOTE — DISCHARGE NOTE ADULT - CARE PLAN
Principal Discharge DX:	Chronic obstructive pulmonary disease, unspecified COPD type  Goal:	prevention of complications  Assessment and plan of treatment:	please continue oral prednisone and oral antibiotic as instructed as well as your other medications; please follow-up with your pulmonologist for further evaluation; please continue oxygen therapy and seek medical attention if you experience any sudden difficulties with breathing  Secondary Diagnosis:	KENNY on CPAP  Goal:	prevention of complications  Assessment and plan of treatment:	continue CPAP at bedtime as tolerated; follow-up with your pulmonologist  Secondary Diagnosis:	DVT, lower extremity  Goal:	prevention of complications  Assessment and plan of treatment:	continue anticoagulation medication as you have already been taking; follow-up with your PMD and pulmonologist

## 2018-05-23 NOTE — PROGRESS NOTE ADULT - ASSESSMENT
79yo F with Past Medical History COPD on 3.5L O2 oxygen at home, KENNY on CPAP, Lung cancer s/p RUL lobectomy in 2014, hx of DVT on Xarelto, Hypothyroidism, Urinary retention with chronic Love, and wheelchair bound at baseline admitted for worsening fatigue and sputum production secondary to acute COPD exacerbation.     Acute on chronic hypercapnic/hypoxic respiratory failure in the setting of COPD and KENNY: CT Chest was negative for pulmonary embolism, though it confirmed significant emphysema.  Her oxygen requirements have returned to baseline.  The patient has been non-compliant with nasal canula and NIPPV since transfer to the general medical floor.  Pulmonary advised switching to PO Prednisone and PO Azithromycin.  Continue nebulizers upon discharge. Blood cultures have been negative.  Wean off supplemental oxygen as tolerated.  Hyperglycemia: likely steroid induced.  Monitor FS with meals--the patient refused treatment with insulin.  History of DVT: continue Xarelto  Hypothyroidism: continue Synthroid  Hx of urinary retention with chronic indwelling love: Urine cultures were positive for Pseudomonas.  The patient appears asymptomatic with no signs of active infection.  GI prophylaxis  Discharge home; time spent = 35 minutes

## 2018-05-23 NOTE — PROGRESS NOTE ADULT - ASSESSMENT
COPD  EXACERBATION, FEELS BETTER, NOT COMPLIANT    - STEROIDS TAPER SLOWLY  - FINISH COURSE OF ABX  - SPIRIVA/ SYMBICORT  - REHAB EVAL  - ALL OVER POOR PROGNOSIS  - op f/up

## 2018-05-23 NOTE — DISCHARGE NOTE ADULT - PLAN OF CARE
prevention of complications please continue oral prednisone and oral antibiotic as instructed as well as your other medications; please follow-up with your pulmonologist for further evaluation; please continue oxygen therapy and seek medical attention if you experience any sudden difficulties with breathing continue CPAP at bedtime as tolerated; follow-up with your pulmonologist continue anticoagulation medication as you have already been taking; follow-up with your PMD and pulmonologist

## 2018-06-11 NOTE — H&P ADULT - NSHPPHYSICALEXAM_GEN_ALL_CORE
T(C): 36.9 (06-11-18 @ 23:24), Max: 36.9 (06-11-18 @ 23:24)  HR: 73 (06-11-18 @ 23:24) (73 - 93)  BP: 102/57 (06-11-18 @ 23:24) (91/52 - 117/68)  RR: 16 (06-11-18 @ 23:24) (14 - 18)  SpO2: 95% (06-11-18 @ 23:24) (91% - 98%)    PHYSICAL EXAM:  GENERAL: NAD, well-groomed, well-developed  HEAD:  Atraumatic, Normocephalic  NERVOUS SYSTEM:  Awake and alert  CHEST/LUNG: b/l Decreased BS with mild wheeze  HEART: Regular rate and rhythm;  ABDOMEN: Soft, Nontender, ; Bowel sounds present  EXTREMITIES:  , No clubbing, cyanosis, or edema

## 2018-06-11 NOTE — ED ADULT NURSE NOTE - OBJECTIVE STATEMENT
states patient has been lethargic for the last 2 days. Pt wears home 02 and BiPap when been. Pt lethargic at this time but easily aroused.

## 2018-06-11 NOTE — ED PROVIDER NOTE - OBJECTIVE STATEMENT
79 y/o F PMHx diabetes and dementia on Baby Aspirin here for evaluation of dizziness. Daughter reports pt reports feeling light headed and dizzy when she walks. Unclear onset of SX, being either yesterday or today. Pt normally walks with aide at side, but now feels like she needs more help walking. Pt does express mild SOB, without fever, cough, CP, or leg swelling. Pt with no other complaints. 85 y/o F PMHx COPD on 3.5 L oxygen (baseline pulse ox 92-94), as well as R upper lobectomy, presents to ED for evaluation of not being herself. Pt’s  states since yesterday pt has been “out of it,” and he had to apply non rebreather this morning to get pulse ox above 80. No recent fevers, chills, or cough.  not sure unsure if SX/confusion are a result of pt taking Benadryl 25 mg this morning.

## 2018-06-11 NOTE — ED ADULT NURSE NOTE - BREATH SOUNDS, MLM
Discussed plan for cath with Dr Felizardo Peabody. [facility] Wed 11 am, Dr Jeannie Slade. NPO after midnight. Clear

## 2018-06-11 NOTE — H&P ADULT - NSHPREVIEWOFSYSTEMS_GEN_ALL_CORE
REVIEW OF SYSTEMS:    CONSTITUTIONAL: No, fevers or chills  RESPIRATORY: As in HPI  CARDIOVASCULAR: No chest pain or palpitations  GASTROINTESTINAL: No abdominal or epigastric pain. No nausea, vomiting, or hematemesis; No diarrhea or constipation. No melena or hematochezia.  GENITOURINARY:Has chronic love  NEUROLOGICAL: No numbness or weakness REVIEW OF SYSTEMS:    CONSTITUTIONAL: No, fevers or chills  RESPIRATORY: As in HPI  CARDIOVASCULAR: No chest pain or palpitations  GASTROINTESTINAL: No abdominal or epigastric pain. No nausea, vomiting, or hematemesis; No diarrhea or constipation. No melena or hematochezia.  GENITOURINARY: Has chronic love  NEUROLOGICAL: No numbness or weakness

## 2018-06-11 NOTE — ED PROVIDER NOTE - PROGRESS NOTE DETAILS
co2 noted to be elevated pt moved to crit. pt s/o Dr. Arriaza Patient transferred to crit for CO2 of 120. As per  (retired pulmonologist), patient's baseline CO2 is at 80-90. Patient is on BIPAP, much improved, requested to take mask off, refusing ABG at this time. PAtient accepted to ICU.

## 2018-06-11 NOTE — H&P ADULT - HISTORY OF PRESENT ILLNESS
79yo F with Past Medical History COPD on 3.5L O2 oxygen at home, also on Bipap at home KENNY on CPAP, Lung cancer s/p RUL lobectomy in 2014, hx of DVT on Xarelto, Hypothyroidism, Urinary retention with chronic Garcia, and wheelchair bound at baseline  was brought in by  for c/o lethargy and change in mental status.  is a retired pulmonologist who states that he think her Bipap might not have been working properly. He also states that the pt was diagnosed with UTI and was taking PO Augmentin for it. No h/o recent fever, SOB, chest pain, increase in cough.   H/o recent hospitalization in May 2018 for ARF 2/2 to COPD exacerbation. 77yo F with Past Medical History COPD on 3.5L O2 oxygen at home, also on Bipap at home KENNY on CPAP, Lung cancer s/p RUL lobectomy in 2014, hx of DVT on Xarelto, Hypothyroidism, Urinary retention with chronic Garcia, and wheelchair bound at baseline  was brought in by  for c/o lethargy and change in mental status.    is a retired pulmonologist who states that he think her Bipap might not have been working properly. He also states that the pt was diagnosed with UTI and was taking PO Augmentin for it. No h/o recent fever, SOB, chest pain, increase in cough.   H/o recent hospitalization in May 2018 for ARF 2/2 to COPD exacerbation.     In the ED pt was found to have Pco2 of 120 (baseline 80-90) with ph 7.26 and was started on Bipap. Pt feels better now and is comfortable, but she refuses repeat ABG checks. While in ED , she has been hemodynamically stable

## 2018-06-11 NOTE — ED ADULT TRIAGE NOTE - CHIEF COMPLAINT QUOTE
Pt BIBA from home as per  reports pt has difficulty sleeping took 50 mg of Benadryl at 0500.  reports pt has been lethargic today. Pt a & o x 4

## 2018-06-11 NOTE — ED PROVIDER NOTE - MEDICAL DECISION MAKING DETAILS
83 yo F with + COPD, chronic CO2 retainer presented with sleepiness, founded to have CO2 of 120, placed on BiPAP, mental status improved, admitted to ICU

## 2018-06-11 NOTE — H&P ADULT - ASSESSMENT
79yo F with Past Medical History COPD on 3.5L O2 oxygen at home, also on Bipap at home KENNY on CPAP, Lung cancer s/p RUL lobectomy in 2014, hx of DVT on Xarelto, was brought in by  for c/o lethargy and change in mental status. In the ED pt was found to have Pco2 of 120 (baseline 80-90) with ph 7.26 and was started on Bipap. Pt feels better now and is comfortable, but she refuses repeat ABG checks. While in ED , she has been hemodynamically stable.    1) Acute hypercapnic respiratory failure 2/2 to COPD exacerbation/ pulmonary edema  ?Bipap not working at home  Admit to ICU  Pt feels better now. But refusing repeat ABG  On bipap now at 35 % oxygen. Continue same (pt uses bipap at home)  Will give one dose of iv lasix. Pt on Bumex at home   f/u Echo. Strict input output and daily weight.   Follow up cx and iv abx, iv steroids, nebs  Check UA (pt was recently treated for UTI)    2) h/o DVT on Xarelto: Continue same if pt is weaned of Bipap. If not start heparin drip    From home   Lives with   DVT ppx- pt on xarelto  NPO as pt on Bipap  OOb to chair (pt wheelchair bound) 79yo F with Past Medical History COPD on 3.5L O2 oxygen at home, also on Bipap at home KENNY on CPAP, Lung cancer s/p RUL lobectomy in 2014, hx of DVT on Xarelto, was brought in by  for c/o lethargy and change in mental status. In the ED pt was found to have Pco2 of 120 (baseline 80-90) with ph 7.26 and was started on Bipap. Pt feels better now and is comfortable, but she refuses repeat ABG checks. While in ED , she has been hemodynamically stable.    1) Acute hypercapnic respiratory failure 2/2 to COPD exacerbation/ pulmonary edema  ?Bipap not working at home  Admit to ICU  Pt feels better now. But refusing repeat ABG  On bipap now at 35 % oxygen. Continue same (pt uses bipap at home)  Will give one dose of iv lasix. Pt on Bumex at home   f/u Echo. Strict input output and daily weight.   Repeat CE. Repeat CXR in am   Follow up cx and iv abx, iv steroids, nebs  Check UA (pt was recently treated for UTI)    2) h/o DVT on Xarelto: Continue same if pt is weaned of Bipap. If not start heparin drip    3) Hypothyroidism: c/w synthroid     From home   Lives with   DVT ppx- pt on xarelto  NPO as pt on Bipap  OOb to chair (pt wheelchair bound)

## 2018-06-11 NOTE — H&P ADULT - NSHPLABSRESULTS_GEN_ALL_CORE
11.0   9.24  )-----------( 314      ( 11 Jun 2018 17:39 )             35.0       06-11    142  |  85<L>  |  13  ----------------------------<  149<H>  3.2<L>   |  49<HH>  |  1.1    Ca    8.6      11 Jun 2018 17:39    TPro  6.7  /  Alb  3.8  /  TBili  0.3  /  DBili  x   /  AST  14  /  ALT  12  /  AlkPhos  191<H>  06-11          PT/INR - ( 11 Jun 2018 17:39 )   PT: 17.30 sec;   INR: 1.59 ratio         PTT - ( 11 Jun 2018 17:39 )  PTT:49.4 sec    Lactate Trend      CARDIAC MARKERS ( 11 Jun 2018 17:39 )  x     / <0.01 ng/mL / x     / x     / x        Blood Gas Profile - Venous (05.17.18 @ 23:59)    pH, Venous: 7.36    pCO2, Venous: 95 mmHg    pO2, Venous: 52 mmHg    HCO3, Venous: 54 mmoL/L    Base Excess, Venous: 23.7 mmoL/L    Oxygen Saturation, Venous: 83 %    CXR: b/l effusion. Rt > Lt

## 2018-06-12 NOTE — CONSULT NOTE ADULT - SUBJECTIVE AND OBJECTIVE BOX
Patient is a 78y old  Female who presents with altered mental status.      HPI:  79yo F with Past Medical History COPD on 3.5L O2 oxygen at home, also on Bipap at home KENNY on CPAP, Lung cancer s/p RUL lobectomy in , hx of DVT on Xarelto, and urinary retention with chronic Garcia, was brought in by  for  lethargy and change in mental status.    is a retired pulmonologist who states that he thinks her Bipap might not have been working properly. He also states that the pt was diagnosed with UTI and was taking PO Augmentin for it. No h/o recent fever, SOB, chest pain, increase in cough.   H/o recent hospitalization in May 2018 for ARF 2/2 to COPD exacerbation.     In the ED pt was found to have Pco2 of 120 (baseline 80-90) with ph 7.26 and was started on Bipap. Pt feels better now and is comfortable, but she refuses repeat ABG checks. While in ED , she has been hemodynamically stable (2018 23:13)      PAST MEDICAL & SURGICAL HISTORY:  Urinary retention  Lung cancer  DVT, lower extremity  Hypothyroidism  KENNY on CPAP  Chronic obstructive pulmonary disease, unspecified COPD type  History of hip surgery  S/P knee surgery  S/P lobectomy of lung      SOCIAL HX:   Smoking        quit 15 years ago                 ETOH    neg                        Other   neg    FAMILY HISTORY:  No pertinent family history      ROS:  See HPI     Allergies  Cipro (Unknown)    Intolerances          PHYSICAL EXAM    ICU Vital Signs Last 24 Hrs  T(C): 36.8 (2018 09:29), Max: 36.9 (2018 23:24)  T(F): 98.2 (2018 09:29), Max: 98.5 (2018 23:24)  HR: 81 (2018 12:45) (68 - 93)  BP: 115/57 (2018 12:45) (91/52 - 129/66)  RR: 20 (2018 12:45) (14 - 22)  SpO2: 92% (2018 12:45) (90% - 98%)      General: NAD  HEENT:  BLUE              Lymph Nodes: No cervical LN   Lungs: Bilateral BS, no wheezing, bibasilar crackles  Cardiovascular: irregular  Abdomen: Soft, Positive BS  Extremities: No clubbing, + 2 b/l LLE  Skin: Warm  Neurological: Non focal       18 @ 07:01  -  18 @ 07:00  --------------------------------------------------------  IN:    IV PiggyBack: 400 mL  Total IN: 400 mL    OUT:    Indwelling Catheter - Urethral: 700 mL  Total OUT: 700 mL    Total NET: -300 mL      18 @ 07:01  -  18 @ 15:43  --------------------------------------------------------  IN:  Total IN: 0 mL    OUT:    Indwelling Catheter - Urethral: 100 mL  Total OUT: 100 mL    Total NET: -100 mL          LABS:                          11.0   9.24  )-----------( 314      ( 2018 17:39 )             35.0                                                   142  |  85<L>  |  13  ----------------------------<  149<H>  3.2<L>   |  49<HH>  |  1.1    Ca    8.6      2018 17:39    TPro  6.7  /  Alb  3.8  /  TBili  0.3  /  DBili  x   /  AST  14  /  ALT  12  /  AlkPhos  191<H>        PT/INR - ( 2018 17:39 )   PT: 17.30 sec;   INR: 1.59 ratio         PTT - ( 2018 17:39 )  PTT:49.4 sec                                       Urinalysis Basic - ( 2018 23:50 )    Color: Yellow / Appearance: Clear / S.010 / pH: x  Gluc: x / Ketone: Negative  / Bili: Negative / Urobili: 1.0 mg/dL   Blood: x / Protein: Negative mg/dL / Nitrite: Positive   Leuk Esterase: Moderate / RBC: x / WBC >50 /HPF   Sq Epi: x / Non Sq Epi: x / Bacteria: Moderate /HPF        CARDIAC MARKERS ( 2018 17:39 )  x     / <0.01 ng/mL / x     / x     / x                                                LIVER FUNCTIONS - ( 2018 17:39 )  Alb: 3.8 g/dL / Pro: 6.7 g/dL / ALK PHOS: 191 U/L / ALT: 12 U/L / AST: 14 U/L / GGT: x                                                                                                                                       X-Rays     elevated Left hemidiaphragm                                                                           MEDICATIONS  (STANDING):  ALBUTerol/ipratropium for Nebulization 3 milliLiter(s) Nebulizer every 6 hours  buMETAnide 1 milliGRAM(s) Oral two times a day  cefTRIAXone   IVPB 1 Gram(s) IV Intermittent every 24 hours  cefTRIAXone   IVPB      levothyroxine 50 MICROGram(s) Oral daily  methylPREDNISolone sodium succinate Injectable 80 milliGRAM(s) IV Push two times a day  pantoprazole    Tablet 40 milliGRAM(s) Oral before breakfast  rivaroxaban 20 milliGRAM(s) Oral every 24 hours    MEDICATIONS  (PRN):

## 2018-06-12 NOTE — CONSULT NOTE ADULT - ASSESSMENT
IMPRESSION:  Acute on Chronic hypercapnic respiratory failure  non-compliance with BPAP  h/o COPD  h/o lung Ca  UTI w/ chronic indwelling catheter    PLAN:    CNS: no depressants    HEENT: Oral care    PULMONARY:  HOB @ 45 degrees, BPAP qHS, nebs PRN, d/c steroids, O2 via NC to keep spO2 between 88-92 %    CARDIOVASCULAR: avoid volume overload    GI: oral diet    RENAL:  Follow up lytes.  Correct as needed    INFECTIOUS DISEASE: Follow up cultures. Iv rocephin    HEMATOLOGICAL: on xarelto    ENDOCRINE:  Follow up FS.  Insulin protocol if needed.    MUSCULOSKELETAL: bedbound    The patient would not benefit from ICU monitoring at the time being. Please recall us as needed. IMPRESSION:  Acute on Chronic hypercapnic respiratory failure resolved  non-compliance with BPAP  COPD with mild exacerbation   h/o lung Ca sp resection   UTI w/ chronic indwelling catheter    PLAN:    CNS: no depressants    HEENT: Oral care    PULMONARY:  HOB @ 45 degrees, BiPAP qHS and PRN during the day. nebs PRN, Continue steroids, O2 via NC to keep spO2 between 88-92 %    CARDIOVASCULAR: avoid volume overload    GI: oral diet outside NIV    RENAL:  Follow up lytes.  Correct as needed    INFECTIOUS DISEASE: Follow up cultures. Iv rocephin    HEMATOLOGICAL: on xarelto    ENDOCRINE:  Follow up FS.  Insulin protocol if needed.    MUSCULOSKELETAL: bedbound    Admit to floor for now  Recall if status changes IMPRESSION:  Acute on Chronic hypercapnic respiratory failure resolved  non-compliance with BPAP  COPD with mild exacerbation   h/o lung Ca sp resection   UTI w/ chronic indwelling catheter    PLAN:    CNS: no depressants    HEENT: Oral care    PULMONARY:  HOB @ 45 degrees, BiPAP qHS and PRN during the day. nebs PRN, Continue steroids, O2 via NC to keep spO2 between 88-92 %    CARDIOVASCULAR: avoid volume overload    GI: oral diet outside NIV    RENAL:  Follow up lytes.  Correct as needed    INFECTIOUS DISEASE: Follow up cultures. Iv Cefepime for now. Kidney US     HEMATOLOGICAL: on xarelto    ENDOCRINE:  Follow up FS.  Insulin protocol if needed.    MUSCULOSKELETAL: bedbound    Admit to floor for now  Recall if status changes

## 2018-06-12 NOTE — ED ADULT NURSE REASSESSMENT NOTE - NS ED NURSE REASSESS COMMENT FT1
Pt reassessed. Pt not tolerating being on NC at 4L. Pulse ox 92% but pt is tachypneic, difficulty breathing. Pt switched back to venturi mask at 8L. Will cont to assess pt. 1167 md notified

## 2018-06-12 NOTE — ED ADULT NURSE REASSESSMENT NOTE - NS ED NURSE REASSESS COMMENT FT1
Pt noncompliant with bipap. PT refusing bipap, taking off mask. Pt educated. Pox drops to 80%. Ventimask applied. Pox 94%. Pt seen and evaluated by MD. 1:1 placed.

## 2018-06-12 NOTE — ED ADULT NURSE REASSESSMENT NOTE - NS ED NURSE REASSESS COMMENT FT1
Pt refused morning meds. Pt states all her meds she takes at night and does not want any at this time. pt is comfortable in bed. nursing care provided.  No distress at this time. O2 mask in place. Pulse ox at 90% 4L.

## 2018-06-12 NOTE — ED ADULT NURSE REASSESSMENT NOTE - NS ED NURSE REASSESS COMMENT FT1
Refusing non-skid socks. PT demanding ice, MD aware, PT allowed ice chips however, pt cannot tolerate, cough noted.

## 2018-06-12 NOTE — ED ADULT NURSE REASSESSMENT NOTE - NS ED NURSE REASSESS COMMENT FT1
Pt assessed. VSS. pt resting comfortably in bed. No signs of distress. pt requesting to eat. Pt taken off of venturi mask as per md 1167, performing trial of nasal cannula 02 at 4L at this time. Will reassess pt's oxygen level. Nursing care provided.

## 2018-06-13 NOTE — PROGRESS NOTE ADULT - ASSESSMENT
77yo F with Past Medical History COPD on 3.5L O2 oxygen at home, also on Bipap at home KENNY on CPAP, Lung cancer s/p RUL lobectomy in 2014, hx of DVT on Xarelto, was brought in by  for c/o lethargy and change in mental status. In the ED pt was found to have Pco2 of 120 (baseline 80-90) with ph 7.26 and was started on Bipap. Pt feels better now and is comfortable, but she refuses repeat ABG checks. While in ED , she has been hemodynamically stable.    1) Acute hypercapnic respiratory failure 2/2 to COPD exacerbation/ pulmonary edema  ?Bipap not working at home  Evaluated by ICU- not upgraded as pt improved but refusing repeat ABG, sent to medical floors  Pulm: BiPAP qHS and PRN during the day. nebs PRN, Short Prednisone taper, O2 via NC to keep spO2 between 88-92 %  On bipap now at 35 % oxygen. Continue same (pt uses bipap at home)  s/p one dose of iv lasix. Pt on Bumex at home  f/u Echo. Strict input output and daily weight.   CE neg x1. CXR showed stable elevation right hemidiaphragm. Stable pulmonary vascularity. f/u repeat CXR  BCx 6/11: NGTD    # Acute Cystitis  - UA 6/11 pos  - c/w Abx  - UCx from 5/20 showed pansensitive P. aereuginosa    2) h/o DVT on Xarelto: Continue same if pt is weaned off Bipap. If not start heparin drip    3) Hypothyroidism: c/w synthroid     From home   Lives with   DVT ppx- pt on xarelto  Pt on Bipap, DASH/tLC diet when off bipap  OOb to chair (pt wheelchair bound) 79yo F with Past Medical History COPD on 3.5L O2 oxygen at home, also on Bipap at home KENNY on CPAP, Lung cancer s/p RUL lobectomy in 2014, hx of DVT on Xarelto, was brought in by  for c/o lethargy and change in mental status. In the ED pt was found to have Pco2 of 120 (baseline 80-90) with ph 7.26 and was started on Bipap. Pt feels better now and is comfortable, but she refuses repeat ABG checks. While in ED , she has been hemodynamically stable. Pt admitted with acute on chronic resp failure due to COPD exacerbation    1) Acute hypercapnic respiratory failure 2/2 to COPD exacerbation/ pulmonary edema  Pt has been using a bipap at home which is proven ineffective, will need an NIV- AVAP AE on discharge. It is medically necessary this patient use this for bedtime, naptime and periods of SOB, withouth NIV the patient will clinically decline and cause readmission  Evaluated by ICU- not upgraded as pt improved but refusing repeat ABG, sent to medical floors  Pulm: BiPAP qHS and PRN during the day. nebs PRN, Short Prednisone taper, O2 via NC to keep spO2 between 88-92 %  On bipap now at 35 % oxygen. Continue same (pt uses bipap at home)  s/p one dose of iv lasix. Pt on Bumex at home  f/u Echo. Strict input output and daily weight.   CE neg x1. CXR showed stable elevation right hemidiaphragm. Stable pulmonary vascularity. f/u repeat CXR  BCx 6/11: NGTD    # Acute Cystitis  - UA 6/11 pos  - c/w Abx  - UCx from 5/20 showed pansensitive P. aereuginosa    2) h/o DVT on Xarelto: Continue same if pt is weaned off Bipap. If not start heparin drip    3) Hypothyroidism: c/w synthroid     From home   Lives with   DVT ppx- pt on xarelto  Pt on Bipap, DASH/tLC diet when off bipap  OOb to chair (pt wheelchair bound) 79yo F with Past Medical History COPD on 3.5L O2 oxygen at home, also on Bipap at home KENNY on CPAP, Lung cancer s/p RUL lobectomy in 2014, hx of DVT on Xarelto, was brought in by  for c/o lethargy and change in mental status. In the ED pt was found to have Pco2 of 120 (baseline 80-90) with ph 7.26 and was started on Bipap. Pt feels better now and is comfortable, but she refuses repeat ABG checks. While in ED , she has been hemodynamically stable. Pt admitted with acute on chronic resp failure due to COPD exacerbation    # Acute hypercapnic respiratory failure 2/2 to COPD exacerbation/ pulmonary edema  Pt has been using a bipap at home which is proven ineffective, will need an NIV- AVAP AE on discharge. It is medically necessary this patient use this for bedtime, naptime and periods of SOB, withouth NIV the patient will clinically decline and cause readmission  Evaluated by ICU- not upgraded as pt improved but refusing repeat ABG, sent to medical floors  Pulm: BiPAP qHS and PRN during the day. nebs PRN, Short Prednisone taper, O2 via NC to keep spO2 between 88-92 %  On bipap now at 35 % oxygen. Continue same (pt uses bipap at home)  s/p one dose of iv lasix. Pt on Bumex at home  f/u Echo. Strict input output and daily weight.   CE neg x1. CXR showed stable elevation right hemidiaphragm. Stable pulmonary vascularity. f/u repeat CXR  BCx 6/11: NGTD    # Acute Cystitis  - UA 6/11 pos  - c/w Abx  - UCx from 5/20 showed pansensitive P. aereuginosa    # h/o DVT on Xarelto: Continue same if pt is weaned off Bipap. If not start heparin drip    # Hypothyroidism: c/w synthroid     From home   Lives with   DVT ppx- pt on xarelto  Pt on Bipap, DASH/tLC diet when off bipap  OOb to chair (pt wheelchair bound)

## 2018-06-13 NOTE — PROGRESS NOTE ADULT - ASSESSMENT
IMPRESSION:  Acute on Chronic hypercapnic respiratory failure resolved  COPD with mild exacerbation, improved  HO lung Ca sp resection   UTI w/ chronic indwelling catheter    PLAN:    CNS: no depressants    HEENT: Oral care    PULMONARY:  HOB @ 45 degrees, BiPAP qHS and PRN during the day. nebs PRN, Short Prednisone taper, O2 via NC to keep spO2 between 88-92 %    CARDIOVASCULAR: avoid volume overload    GI: oral diet outside NIV    RENAL:  Follow up lytes.  Correct as needed    INFECTIOUS DISEASE: Follow up cultures. Iv Cefepime for now. Kidney US     HEMATOLOGICAL: on xarelto    ENDOCRINE:  Follow up FS.  Insulin protocol if needed.    MUSCULOSKELETAL:    OOB to chair

## 2018-06-13 NOTE — PROGRESS NOTE ADULT - ASSESSMENT
77yo F with Past Medical History COPD on 3.5L O2 oxygen at home, also on Bipap at home KENNY on CPAP, Lung cancer s/p RUL lobectomy in 2014, hx of DVT on Xarelto, was brought in by  for c/o lethargy and change in mental status. In the ED pt was found to have Pco2 of 120 (baseline 80-90) with ph 7.26 and was started on Bipap. Pt feels better now and is comfortable, but she refuses repeat ABG checks. While in ED , she has been hemodynamically stable. Pt admitted with acute on chronic resp failure due to COPD exacerbation    # Acute on chronic  hypercapnic respiratory failure 2/2 to COPD exacerbation/ pulmonary edema  Pt has been using a bipap at home which is proven ineffective, will need an NIV- AVAP AE on discharge. It is medically necessary this patient use this for bedtime, naptime and periods of SOB, withouth NIV the patient will clinically decline and cause readmission  Evaluated by ICU- not upgraded as pt improved but refusing repeat ABG, sent to medical floors  Pulm: BiPAP qHS and PRN during the day. nebs PRN, Short Prednisone taper, O2 via NC to keep spO2 between 88-92 %  On bipap now at 35 % oxygen. Continue same (pt uses bipap at home)  s/p one dose of iv lasix. Pt on Bumex at home  f/u Echo. Strict input output and daily weight.   CE neg x1. CXR showed stable elevation right hemidiaphragm. Stable pulmonary vascularity. f/u repeat CXR  BCx 6/11: NGTD    # Acute Cystitis  - UA 6/11 pos  - c/w Abx  - UCx from 5/20 showed pansensitive P. aereuginosa    # h/o DVT on Xarelto:     # Hypothyroidism: c/w synthroid     From home   Lives with   DVT ppx- pt on xarelto  Pt on Bipap, DASH/tLC diet when off bipap  OOb to chair (pt wheelchair bound)

## 2018-06-14 NOTE — PROGRESS NOTE ADULT - ASSESSMENT
IMPRESSION:  Acute on Chronic hypercapnic respiratory failure resolved  COPD with mild exacerbation, improved  HO lung Ca sp resection   UTI w/ chronic indwelling catheter    PLAN:    CNS: no depressants    HEENT: Oral care    PULMONARY:  HOB @ 45 degrees, BiPAP qHS and PRN during the day. nebs PRN, Short Prednisone taper, O2 via NC to keep spO2 between 88-92 %    CARDIOVASCULAR: avoid volume overload    GI: oral diet     RENAL:  Follow up lytes.  Correct as needed    INFECTIOUS DISEASE: Follow up cultures. FU with ID    HEMATOLOGICAL: on xarelto    ENDOCRINE:  Follow up FS.     MUSCULOSKELETAL:    OOB to chair    Recall PRN

## 2018-06-14 NOTE — DISCHARGE NOTE ADULT - MEDICATION SUMMARY - MEDICATIONS TO CHANGE
I will SWITCH the dose or number of times a day I take the medications listed below when I get home from the hospital:    Bumex 1 mg oral tablet  -- 1 tab(s) by mouth 2 times a day

## 2018-06-14 NOTE — PROGRESS NOTE ADULT - SUBJECTIVE AND OBJECTIVE BOX
SUBJECTIVE:    Patient is a 78y old  Female who presents with a chief complaint of Change in mental status (2018 23:13)    Currently admitted to medicine with the primary diagnosis of Hypercapnic respiratory failure     Today is hospital day 2d. This morning she is resting comfortably in bed and reports no new issues or overnight events.     PAST MEDICAL & SURGICAL HISTORY  PAST MEDICAL & SURGICAL HISTORY:  Urinary retention  Lung cancer  DVT, lower extremity  Hypothyroidism  KENNY on CPAP  Chronic obstructive pulmonary disease, unspecified COPD type  History of hip surgery  S/P knee surgery  S/P lobectomy of lung    SOCIAL HISTORY:    ALLERGIES:  Cipro (Unknown)    MEDICATIONS:  STANDING MEDICATIONS  ALBUTerol/ipratropium for Nebulization 3 milliLiter(s) Nebulizer every 6 hours  buMETAnide 1 milliGRAM(s) Oral two times a day  cefTRIAXone   IVPB 1 Gram(s) IV Intermittent every 24 hours  cefTRIAXone   IVPB      levothyroxine 50 MICROGram(s) Oral daily  methylPREDNISolone sodium succinate Injectable 80 milliGRAM(s) IV Push two times a day  pantoprazole    Tablet 40 milliGRAM(s) Oral before breakfast  rivaroxaban 20 milliGRAM(s) Oral every 24 hours    PRN MEDICATIONS    VITALS:   T(F): 97.6  HR: 85  BP: 134/62  RR: 20  SpO2: 92%    HOME MEDS: </u  albuterol 90 mcg/inh inhalation aerosol: 1 puff(s) inhaled every 4 hours  azithromycin 500 mg oral tablet: 1 tab(s) orally once a day  Bumex 1 mg oral tablet: 1 tab(s) orally 2 times a day  Deltasone 20 mg oral tablet: 3 tab(s) orally once a day x 3 days  2 tab(s) orally once a day x 3 days  1 tab(s) orally once a day x 3 days  nystatin 100,000 units/g topical powder: 1 application topically 2 times a day until healing is complete for fungal rash  Protonix 40 mg oral delayed release tablet: 1 tab(s) orally once a day  rivaroxaban 20 mg oral tablet: 1 tab(s) orally every 24 hours  Synthroid 50 mcg (0.05 mg) oral tablet: 1 tab(s) orally once a day      LABS:                        11.0   9.24  )-----------( 314      ( 2018 17:39 )             35.0     06-11    142  |  85<L>  |  13  ----------------------------<  149<H>  3.2<L>   |  49<HH>  |  1.1    Ca    8.6      2018 17:39    TPro  6.7  /  Alb  3.8  /  TBili  0.3  /  DBili  x   /  AST  14  /  ALT  12  /  AlkPhos  191<H>  06-11    PT/INR - ( 2018 17:39 )   PT: 17.30 sec;   INR: 1.59 ratio         PTT - ( 2018 17:39 )  PTT:49.4 sec  Urinalysis Basic - ( 2018 23:50 )    Color: Yellow / Appearance: Clear / S.010 / pH: x  Gluc: x / Ketone: Negative  / Bili: Negative / Urobili: 1.0 mg/dL   Blood: x / Protein: Negative mg/dL / Nitrite: Positive   Leuk Esterase: Moderate / RBC: x / WBC >50 /HPF   Sq Epi: x / Non Sq Epi: x / Bacteria: Moderate /HPF    Culture - Blood (collected 2018 22:53)  Source: .Blood Blood  Preliminary Report (2018 06:00):    No growth to date.    Culture - Blood (collected 2018 18:15)  Source: .Blood Blood  Preliminary Report (2018 01:01):    No growth to date.      CARDIAC MARKERS ( 2018 17:39 )  x     / <0.01 ng/mL / x     / x     / x          RADIOLOGY:  reviewed    PHYSICAL EXAM:  GEN: No acute distress  HEENT: WNL  LUNGS: mild wheezing bilaterally   HEART: S1/S2 present. RRR.   ABD: Soft, non-tender, non-distended. Bowel sounds present  EXT: no LE edema  NEURO: AAOX3
FLOR MCCORMICK  78y  Female      Patient is a 78y old  Female who presents with a chief complaint of Change in mental status (11 Jun 2018 23:13)      INTERVAL HPI/OVERNIGHT EVENTS:      ******************************* REVIEW OF SYSTEMS:**********************************************      All other review of systems negative    *********************** VITALS ******************************************    T(F): 97.1 (06-14-18 @ 05:15)  HR: 77 (06-14-18 @ 05:15) (77 - 101)  BP: 129/85 (06-14-18 @ 05:15) (94/51 - 129/85)  RR: 18 (06-14-18 @ 05:15) (18 - 18)  SpO2: 96% (06-14-18 @ 00:18) (92% - 96%)    06-13-18 @ 07:01  -  06-14-18 @ 07:00  --------------------------------------------------------  IN: 0 mL / OUT: 600 mL / NET: -600 mL    06-14-18 @ 07:01  -  06-14-18 @ 11:51  --------------------------------------------------------  IN: 240 mL / OUT: 2400 mL / NET: -2160 mL            06-13-18 @ 07:01  -  06-14-18 @ 07:00  --------------------------------------------------------  IN: 0 mL / OUT: 600 mL / NET: -600 mL    06-14-18 @ 07:01  -  06-14-18 @ 11:51  --------------------------------------------------------  IN: 240 mL / OUT: 2400 mL / NET: -2160 mL        ******************************** PHYSICAL EXAM:**************************************************  GENERAL: NAD    PSYCH:   HEENT:     NERVOUS SYSTEM:  Alert & Oriented X3,     PULMONARY: VERONIQUE, CTA    CARDIOVASCULAR: S1S2 RRR    GI: Soft, NT, ND; BS present.    EXTREMITIES:  1+ Peripheral  edema    LYMPH: No lymphadenopathy noted    SKIN: No rashes or lesions    ******************************************************************************************    Consultant(s) Notes Reviewed:  [x ] YES  [ ] NO    Discussed with Consultants/Other Providers [ x] YES     **************************** LABS *******************************************************                          9.8    8.49  )-----------( 399      ( 14 Jun 2018 06:36 )             29.2     06-14    133<L>  |  77<L>  |  29<H>  ----------------------------<  336<H>  3.7   |  39<H>  |  1.2    Ca    8.6      14 Jun 2018 06:36  Mg     1.5     06-14            Lactate Trend        CAPILLARY BLOOD GLUCOSE              **************************Active Medications *******************************************  Cipro (Unknown)      ALBUTerol/ipratropium for Nebulization 3 milliLiter(s) Nebulizer every 6 hours  buMETAnide 1.5 milliGRAM(s) Oral <User Schedule>  buMETAnide 1 milliGRAM(s) Oral <User Schedule>  cefTRIAXone   IVPB      cefTRIAXone   IVPB 1 Gram(s) IV Intermittent every 24 hours  levothyroxine 50 MICROGram(s) Oral daily  pantoprazole    Tablet 40 milliGRAM(s) Oral before breakfast  rivaroxaban 20 milliGRAM(s) Oral every 24 hours      ***************************************************  RADIOLOGY & ADDITIONAL TESTS:    Imaging Personally Reviewed:  [ ] YES  [ ] NO    HEALTH ISSUES - PROBLEM Dx:
FLOR MCCORMICK  78y  Female      Patient is a 78y old  Female who presents with a chief complaint of Change in mental status (2018 23:13)      INTERVAL HPI/OVERNIGHT EVENTS:      ******************************* REVIEW OF SYSTEMS:**********************************************      All other review of systems negative    *********************** VITALS ******************************************    T(F): 97.6 (18 @ 13:47)  HR: 101 (18 @ 13:47) (85 - 110)  BP: 100/55 (18 @ 13:47) (100/55 - 134/62)  RR: 18 (18 @ 13:47) (18 - 20)  SpO2: 92% (18 @ 09:49) (91% - 95%)    18 @ 07:01  -  18 @ 07:00  --------------------------------------------------------  IN: 0 mL / OUT: 1500 mL / NET: -1500 mL    18 @ 07:01  -  18 @ 18:05  --------------------------------------------------------  IN: 0 mL / OUT: 600 mL / NET: -600 mL            18 @ 07:01  -  18 @ 07:00  --------------------------------------------------------  IN: 0 mL / OUT: 1500 mL / NET: -1500 mL    18 @ 07:01  -  18 @ 18:05  --------------------------------------------------------  IN: 0 mL / OUT: 600 mL / NET: -600 mL        ******************************** PHYSICAL EXAM:**************************************************  GENERAL: NAD    PSYCH: no agitation, baseline mentation  HEENT:     NERVOUS SYSTEM:  Alert & Oriented X3,     PULMONARY: VERONIQUE, fine crackles b/b    CARDIOVASCULAR: S1S2 RRR    GI: Soft, NT, ND; BS present.    EXTREMITIES:  1+ Peripheral  edema    LYMPH: No lymphadenopathy noted    SKIN: No rashes or lesions    ******************************************************************************************    Consultant(s) Notes Reviewed:  [x ] YES  [ ] NO    Discussed with Consultants/Other Providers [ x] YES     **************************** LABS *******************************************************              Urinalysis Basic - ( 2018 23:50 )    Color: Yellow / Appearance: Clear / S.010 / pH: x  Gluc: x / Ketone: Negative  / Bili: Negative / Urobili: 1.0 mg/dL   Blood: x / Protein: Negative mg/dL / Nitrite: Positive   Leuk Esterase: Moderate / RBC: x / WBC >50 /HPF   Sq Epi: x / Non Sq Epi: x / Bacteria: Moderate /HPF        Lactate Trend        CAPILLARY BLOOD GLUCOSE              **************************Active Medications *******************************************  Cipro (Unknown)      ALBUTerol/ipratropium for Nebulization 3 milliLiter(s) Nebulizer every 6 hours  buMETAnide 1 milliGRAM(s) Oral two times a day  cefTRIAXone   IVPB 1 Gram(s) IV Intermittent every 24 hours  cefTRIAXone   IVPB      levothyroxine 50 MICROGram(s) Oral daily  methylPREDNISolone sodium succinate Injectable 80 milliGRAM(s) IV Push two times a day  pantoprazole    Tablet 40 milliGRAM(s) Oral before breakfast  rivaroxaban 20 milliGRAM(s) Oral every 24 hours      ***************************************************  RADIOLOGY & ADDITIONAL TESTS:    Imaging Personally Reviewed:  [ ] YES  [ ] NO    HEALTH ISSUES - PROBLEM Dx:
SUBJECTIVE:      REVIEW OF SYSTEMS:  See HPI    PHYSICAL EXAM  Vital Signs Last 24 Hrs  T(C): 36.2 (14 Jun 2018 13:52), Max: 36.6 (13 Jun 2018 21:48)  T(F): 97.2 (14 Jun 2018 13:52), Max: 97.8 (13 Jun 2018 21:48)  HR: 93 (14 Jun 2018 13:52) (77 - 99)  BP: 126/66 (14 Jun 2018 13:52) (94/51 - 129/85)  BP(mean): --  RR: 18 (14 Jun 2018 13:52) (18 - 18)  SpO2: 96% (14 Jun 2018 00:18) (96% - 96%)    General: In NAD  HEENT: BLUE               Lymph Nodes: No Cervical LN    Lungs: Fernando BS  Cardiovascular: Regular  Abdomen: Soft. + BS  Extremities: No clubbing   Skin: Warm  Neurological: Non focal      06-13-18 @ 07:01  -  06-14-18 @ 07:00  --------------------------------------------------------  IN:  Total IN: 0 mL    OUT:    Indwelling Catheter - Urethral: 600 mL  Total OUT: 600 mL    Total NET: -600 mL      06-14-18 @ 07:01  -  06-14-18 @ 19:54  --------------------------------------------------------  IN:    Oral Fluid: 720 mL  Total IN: 720 mL    OUT:    Indwelling Catheter - Urethral: 3100 mL  Total OUT: 3100 mL    Total NET: -2380 mL          LABS:                          9.8    8.49  )-----------( 399      ( 14 Jun 2018 06:36 )             29.2                                               06-14    133<L>  |  77<L>  |  29<H>  ----------------------------<  336<H>  3.7   |  39<H>  |  1.2    Ca    8.6      14 Jun 2018 06:36  Mg     1.5     06-14                                                                                                                                      Culture - Urine (collected 11 Jun 2018 23:50)  Source: .Urine Catheterized  Final Report (14 Jun 2018 19:16):    50,000 - 99,000 CFU/mL Pseudomonas aeruginosa  Organism: Pseudomonas aeruginosa (14 Jun 2018 19:16)  Organism: Pseudomonas aeruginosa (14 Jun 2018 19:16)    Culture - Blood (collected 11 Jun 2018 22:53)  Source: .Blood Blood  Preliminary Report (13 Jun 2018 06:00):    No growth to date.                                                    MEDICATIONS  (STANDING):  ALBUTerol/ipratropium for Nebulization 3 milliLiter(s) Nebulizer every 6 hours  buMETAnide 1.5 milliGRAM(s) Oral <User Schedule>  buMETAnide 1 milliGRAM(s) Oral <User Schedule>  cefTRIAXone   IVPB      cefTRIAXone   IVPB 1 Gram(s) IV Intermittent every 24 hours  levothyroxine 50 MICROGram(s) Oral daily  pantoprazole    Tablet 40 milliGRAM(s) Oral before breakfast  rivaroxaban 20 milliGRAM(s) Oral every 24 hours    MEDICATIONS  (PRN):
SUBJECTIVE: Looks and feels better/  Tolerated NIV 4 hours last night.  no SOB at rest.      REVIEW OF SYSTEMS:  See HPI    PHYSICAL EXAM  Vital Signs Last 24 Hrs  T(C): 36.4 (2018 04:47), Max: 37.2 (2018 15:45)  T(F): 97.6 (2018 04:47), Max: 98.9 (2018 15:45)  HR: 85 (2018 04:47) (80 - 110)  BP: 134/62 (2018 04:47) (115/57 - 134/62)  BP(mean): --  RR: 20 (2018 04:47) (20 - 24)  SpO2: 91% (2018 20:04) (91% - 94%)    General: In NAD  HEENT: BLUE               Lymph Nodes: No Cervical LN    Lungs: Fernando BS  Cardiovascular: Regular  Abdomen: Soft. + BS  Extremities: No clubbing   Skin: Warm  Neurological: Non focal      18 @ 07:01  -  18 @ 07:00  --------------------------------------------------------  IN:  Total IN: 0 mL    OUT:    Indwelling Catheter - Urethral: 1500 mL  Total OUT: 1500 mL    Total NET: -1500 mL          LABS:                          11.0   9.24  )-----------( 314      ( 2018 17:39 )             35.0                                                   142  |  85<L>  |  13  ----------------------------<  149<H>  3.2<L>   |  49<HH>  |  1.1    Ca    8.6      2018 17:39    TPro  6.7  /  Alb  3.8  /  TBili  0.3  /  DBili  x   /  AST  14  /  ALT  12  /  AlkPhos  191<H>  06-11      PT/INR - ( 2018 17:39 )   PT: 17.30 sec;   INR: 1.59 ratio         PTT - ( 2018 17:39 )  PTT:49.4 sec                                       Urinalysis Basic - ( 2018 23:50 )    Color: Yellow / Appearance: Clear / S.010 / pH: x  Gluc: x / Ketone: Negative  / Bili: Negative / Urobili: 1.0 mg/dL   Blood: x / Protein: Negative mg/dL / Nitrite: Positive   Leuk Esterase: Moderate / RBC: x / WBC >50 /HPF   Sq Epi: x / Non Sq Epi: x / Bacteria: Moderate /HPF        CARDIAC MARKERS ( 2018 17:39 )  x     / <0.01 ng/mL / x     / x     / x                                                LIVER FUNCTIONS - ( 2018 17:39 )  Alb: 3.8 g/dL / Pro: 6.7 g/dL / ALK PHOS: 191 U/L / ALT: 12 U/L / AST: 14 U/L / GGT: x                                                  Culture - Blood (collected 2018 22:53)  Source: .Blood Blood  Preliminary Report (2018 06:00):    No growth to date.    Culture - Blood (collected 2018 18:15)  Source: .Blood Blood  Preliminary Report (2018 01:01):    No growth to date.                                                    MEDICATIONS  (STANDING):  ALBUTerol/ipratropium for Nebulization 3 milliLiter(s) Nebulizer every 6 hours  buMETAnide 1 milliGRAM(s) Oral two times a day  cefTRIAXone   IVPB 1 Gram(s) IV Intermittent every 24 hours  cefTRIAXone   IVPB      levothyroxine 50 MICROGram(s) Oral daily  methylPREDNISolone sodium succinate Injectable 80 milliGRAM(s) IV Push two times a day  pantoprazole    Tablet 40 milliGRAM(s) Oral before breakfast  rivaroxaban 20 milliGRAM(s) Oral every 24 hours    MEDICATIONS  (PRN):

## 2018-06-14 NOTE — DISCHARGE NOTE ADULT - PATIENT PORTAL LINK FT
You can access the [facility], offered by [facility], by registering with the following website: [email or web address]

## 2018-06-14 NOTE — DISCHARGE NOTE ADULT - HOSPITAL COURSE
77yo F with Past Medical History COPD on 3.5L O2 oxygen at home, also on Bipap at home KENNY on CPAP, Lung cancer s/p RUL lobectomy in 2014, hx of DVT on Xarelto, was brought in by  for c/o lethargy and change in mental status. In the ED pt was found to have Pco2 of 120 (baseline 80-90) with ph 7.26 and was started on Bipap. Pt admitted with acute on chronic resp failure due to COPD exacerbation,  improved and has been hemodynamically stable on bipap    # Acute hypercapnic respiratory failure 2/2 to COPD exacerbation/ pulmonary edema  Pt's bipap has been malfunctioning  Evaluated by ICU- not upgraded as pt improved, sent to medical floors  Pulm: BiPAP qHS and PRN during the day. nebs PRN, s/p solumedrol changed to Short Prednisone taper, O2 via NC to keep spO2 between 88-92 %  On bipap now at 35 % oxygen. Continue same (pt uses bipap at home)  s/p one dose of iv lasix. Pt on Bumex at home  f/u Echo. Strict input output and daily weight.   CE neg x1. CXR showed stable elevation right hemidiaphragm. Stable pulmonary vascularity. f/u repeat CXR  BCx 6/11: NGTD    # Acute Cystitis  - UA 6/11 pos  - c/w Abx  - UCx from 5/20 showed pansensitive P. aereuginosa    # h/o DVT on Xarelto: Continue same if pt is weaned off Bipap. If not start heparin drip    # Hypothyroidism: c/w synthroid     From home   Lives with   DVT ppx- pt on xarelto  Pt on Bipap, DASH/tLC diet when off bipap  OOb to chair (pt wheelchair bound) 77yo F with Past Medical History COPD on 3.5L O2 oxygen at home, also on Bipap at home KENNY on CPAP, Lung cancer s/p RUL lobectomy in 2014, hx of DVT on Xarelto, was brought in by  for c/o lethargy and change in mental status. In the ED pt was found to have Pco2 of 120 (baseline 80-90) with ph 7.26 and was started on Bipap. Pt admitted with acute on chronic resp failure due to COPD exacerbation,  improved and has been hemodynamically stable on bipap    # Acute hypercapnic respiratory failure 2/2 to COPD exacerbation/ pulmonary edema  Pt's bipap was malfunctioning at home--> case management helped set up patient with new one at home  Evaluated by ICU- not upgraded as pt improved, sent to medical floors  Pulm: BiPAP qHS and PRN during the day. nebs PRN, s/p solumedrol changed to Short Prednisone taper, O2 via NC to keep spO2 between 88-92 %  On bipap now at 35 % oxygen. Continue same (pt uses bipap at home)  s/p one dose of iv lasix. Pt on Bumex at home--> restarted inpatient  Echo showed . Strict input output and daily weight.   CE neg x1. CXR showed stable elevation right hemidiaphragm. Stable pulmonary vascularity. f/u repeat CXR  BCx 6/11: NGTD    # Acute Cystitis  - UA 6/11 pos  - c/w Abx  - UCx from 5/20 showed pansensitive P. aereuginosa    # h/o DVT on Xarelto: Continue same if pt is weaned off Bipap. If not start heparin drip    # Hypothyroidism: c/w synthroid     From home   Lives with   DVT ppx- pt on xarelto  Pt on Bipap, DASH/tLC diet when off bipap  OOb to chair (pt wheelchair bound) 77yo F with Past Medical History COPD on 3.5L O2 oxygen at home, also on Bipap at home KENNY on CPAP, Lung cancer s/p RUL lobectomy in 2014, hx of DVT on Xarelto, was brought in by  for c/o lethargy and change in mental status. In the ED pt was found to have Pco2 of 120 (baseline 80-90) with ph 7.26 and was started on Bipap. Pt admitted with acute on chronic resp failure due to COPD exacerbation,  improved and has been hemodynamically stable on bipap    # Acute hypercapnic respiratory failure due to COPD exacerbation and pulmonary edema  Pt's bipap was malfunctioning at home--> case management helped set up patient with new one at home  Evaluated by ICU- not upgraded as pt improved, sent to medical floors  Pulm: BiPAP qHS and PRN during the day. nebs PRN, s/p solumedrol changed to Short Prednisone taper, O2 via NC to keep spO2 between 88-92 %  On bipap now at 35 % oxygen. Continue same (pt uses bipap at home)  s/p one dose of iv lasix. Pt on Bumex at home--> restarted inpatient  Strict input output and daily weight.   CE neg x1. CXR showed stable elevation right hemidiaphragm. Stable pulmonary vascularity. f/u repeat CXR  BCx 6/11: NGTD    # Acute Cystitis  - UA 6/11 pos  - c/w Abx  - UCx from 5/20 showed pansensitive P. aereuginosa    # h/o DVT on Xarelto: Continue same if pt is weaned off Bipap    # Hypothyroidism: c/w synthroid     From home   Lives with   DVT ppx- pt on xarelto  Pt on Bipap, DASH/tLC diet when off bipap  OOb to chair (pt wheelchair bound)

## 2018-06-14 NOTE — ED PROVIDER NOTE - DATE/TIME 2
Patient called to reschedule, she would like to have surgery after summer is over.   Surgery was rescheduled to 09/21 at [facility] due to location preference.    18-May-2018 02:23

## 2018-06-14 NOTE — DISCHARGE NOTE ADULT - ADDITIONAL INSTRUCTIONS
Please follow up with Pulmonology as outpatient Please follow up with Pulmonology and PMD as outpatient

## 2018-06-14 NOTE — PROGRESS NOTE ADULT - ASSESSMENT
77yo F with Past Medical History COPD on 3.5L O2 oxygen at home, also on Bipap at home KENNY on CPAP, Lung cancer s/p RUL lobectomy in 2014, hx of DVT on Xarelto, was brought in by  for c/o lethargy and change in mental status. In the ED pt was found to have Pco2 of 120 (baseline 80-90) with ph 7.26 and was started on Bipap. Pt feels better now and is comfortable, but she refuses repeat ABG checks. While in ED , she has been hemodynamically stable. Pt admitted with acute on chronic resp failure due to COPD exacerbation    # Acute on chronic  hypercapnic respiratory failure 2/2 to COPD exacerbation/ pulmonary edema  Pt has been using a bipap at home which is proven ineffective, will need an NIV- AVAP AE on discharge. It is medically necessary this patient use this for bedtime, naptime and periods of SOB, withouth NIV the patient will clinically decline and cause readmission  Evaluated by ICU- not upgraded as pt improved but refusing repeat ABG, sent to medical floors  Pulm: BiPAP qHS and PRN during the day. nebs PRN, Short Prednisone taper, O2 via NC to keep spO2 between 88-92 %  On bipap now at 35 % oxygen. Continue same (pt uses bipap at home)  s/p one dose of iv lasix. Pt on Bumex at home  f/u Echo. Strict input output and daily weight.   CE neg x1. CXR showed stable elevation right hemidiaphragm. Stable pulmonary vascularity.   BCx 6/11: NGTD    # Acute Cystitis vs colonization from chronic love cath.  - UA 6/11 pos  - c/w Abx  - UCx from 5/20 showed pansensitive P. aereuginosa    # h/o DVT on Xarelto:     # Hypothyroidism: c/w synthroid     From home   Lives with   DVT ppx- pt on xarelto  Pt on Bipap, DASH/tLC diet when off bipap  OOb to chair (pt wheelchair bound)     d/c planning.

## 2018-06-14 NOTE — DISCHARGE NOTE ADULT - MEDICATION SUMMARY - MEDICATIONS TO TAKE
I will START or STAY ON the medications listed below when I get home from the hospital:    predniSONE 10 mg oral tablet  -- 5 tab(s) by mouth once daily x 1 day, then 4 tabs for 1 day, then 3 tabs for 1 day, then 2 tabs for 1 day, then 1 tab for 1 day, then stop  -- It is very important that you take or use this exactly as directed.  Do not skip doses or discontinue unless directed by your doctor.  Obtain medical advice before taking any non-prescription drugs as some may affect the action of this medication.  Take with food or milk.    -- Indication: For Acute on chronic respiratory failure with hypercapnia    rivaroxaban 20 mg oral tablet  -- 1 tab(s) by mouth every 24 hours  -- Indication: For DVT, lower extremity    albuterol 90 mcg/inh inhalation aerosol  -- 1 puff(s) inhaled every 4 hours  -- Indication: For COPD    nystatin 100,000 units/g topical powder  -- 1 application on skin 2 times a day until healing is complete for fungal rash  -- Indication: For Rash    bumetanide 1 mg oral tablet  -- 1 tab(s) by mouth   -- Indication: For CHF    bumetanide 0.5 mg oral tablet  -- 3 tab(s) by mouth once a day   -- Indication: For CHF    Augmentin 500 mg-125 mg oral tablet  -- 1 tab(s) by mouth 2 times a day   -- Finish all this medication unless otherwise directed by prescriber.  Take with food or milk.    -- Indication: For Cystitis    Protonix 40 mg oral delayed release tablet  -- 1 tab(s) by mouth once a day  -- Indication: For Gastritis    Synthroid 50 mcg (0.05 mg) oral tablet  -- 1 tab(s) by mouth once a day  -- Indication: For Hypothyroidism

## 2018-06-14 NOTE — DISCHARGE NOTE ADULT - MEDICATION SUMMARY - MEDICATIONS TO STOP TAKING
I will STOP taking the medications listed below when I get home from the hospital:    Deltasone 20 mg oral tablet  -- 3 tab(s) by mouth once a day x 3 days  2 tab(s) by mouth once a day x 3 days  1 tab(s) by mouth once a day x 3 days    azithromycin 500 mg oral tablet  -- 1 tab(s) by mouth once a day

## 2018-06-14 NOTE — DISCHARGE NOTE ADULT - CARE PROVIDER_API CALL
Juan Rosa), Critical Care Medicine; Pulmonary Disease; Sleep Medicine  [address] [place] [address]  Phone: [phone]  Fax: [phone]    Rafael Echevarria  Internal Medicine- PMD  [address] [place] [address]  Phone: [phone]  Fax: (   )    -

## 2018-06-14 NOTE — DISCHARGE NOTE ADULT - PLAN OF CARE
# Acute hypercapnic respiratory failure 2/2 to COPD exacerbation/ pulmonary edema  Pt's bipap was malfunctioning at home--> case management helped set up with new one  Evaluated by ICU- not upgraded as pt improved, sent to medical floors  Pulm: BiPAP qHS and PRN during the day. nebs PRN, s/p solumedrol changed to Short Prednisone taper, O2 via NC to keep spO2 between 88-92 %  On bipap now at 35 % oxygen. Continue same (pt uses bipap at home)  s/p one dose of iv lasix. Pt on Bumex at home  f/u Echo. Strict input output and daily weight.   CE neg x1. CXR showed stable elevation right hemidiaphragm. Stable pulmonary vascularity. f/u repeat CXR  BCx 6/11: NGTD Clinical Followup Acute hypercapnic respiratory failure due to COPD exacerbation/ pulmonary edema  Pt's bipap was malfunctioning at home--> case management helped set up with new one as well as hospital bed at home  Evaluated by ICU- not upgraded as pt improved, sent to medical floors  Pulm: BiPAP qHS and PRN during the day. nebs PRN, s/p solumedrol changed to Short Prednisone taper, O2 via NC to keep spO2 between 88-92 %  On bipap now at 35 % oxygen. Continue same (pt uses bipap at home)  s/p one dose of iv lasix. Pt on Bumex at home  f/u Echo. Strict input output and daily weight.   CE neg x1. CXR showed stable elevation right hemidiaphragm. Stable pulmonary vascularity. f/u repeat CXR  BCx 6/11: NGTD  Please follow up with Pulmonology as outpatient Clinical Recovery Acute Cystitis  - Urinalysis 6/11 pos  - c/w Antibiotics  - Urine culture from 5/20 showed pansensitive P. areuginosa h/o DVT on Xarelto: Continue same as pt was weaned off Bipap continue with synthroid, follow up outpatient with PMD Acute Cystitis  - Urinalysis 6/11 pos  - c/w home Antibiotics  - Urine culture from 5/20 showed pansensitive P. areuginosa Acute Cystitis  - Urinalysis 6/11 pos  - c/w Augmentin 500mg twice a day for 5 more days  - Urine culture from 5/20 showed pansensitive P. areuginosa

## 2018-06-14 NOTE — DISCHARGE NOTE ADULT - CARE PLAN
Assessment and plan of treatment:	# Acute hypercapnic respiratory failure 2/2 to COPD exacerbation/ pulmonary edema  Pt's bipap was malfunctioning at home--> case management helped set up with new one  Evaluated by ICU- not upgraded as pt improved, sent to medical floors  Pulm: BiPAP qHS and PRN during the day. nebs PRN, s/p solumedrol changed to Short Prednisone taper, O2 via NC to keep spO2 between 88-92 %  On bipap now at 35 % oxygen. Continue same (pt uses bipap at home)  s/p one dose of iv lasix. Pt on Bumex at home  f/u Echo. Strict input output and daily weight.   CE neg x1. CXR showed stable elevation right hemidiaphragm. Stable pulmonary vascularity. f/u repeat CXR  BCx 6/11: NGTD Principal Discharge DX:	Acute on chronic respiratory failure with hypercapnia  Goal:	Clinical Followup  Assessment and plan of treatment:	Acute hypercapnic respiratory failure due to COPD exacerbation/ pulmonary edema  Pt's bipap was malfunctioning at home--> case management helped set up with new one as well as hospital bed at home  Evaluated by ICU- not upgraded as pt improved, sent to medical floors  Pulm: BiPAP qHS and PRN during the day. nebs PRN, s/p solumedrol changed to Short Prednisone taper, O2 via NC to keep spO2 between 88-92 %  On bipap now at 35 % oxygen. Continue same (pt uses bipap at home)  s/p one dose of iv lasix. Pt on Bumex at home  f/u Echo. Strict input output and daily weight.   CE neg x1. CXR showed stable elevation right hemidiaphragm. Stable pulmonary vascularity. f/u repeat CXR  BCx 6/11: NGTD  Please follow up with Pulmonology as outpatient  Secondary Diagnosis:	Cystitis  Goal:	Clinical Recovery  Assessment and plan of treatment:	Acute Cystitis  - Urinalysis 6/11 pos  - c/w Antibiotics  - Urine culture from 5/20 showed pansensitive P. areuginosa  Secondary Diagnosis:	DVT, lower extremity  Goal:	Clinical Followup  Assessment and plan of treatment:	h/o DVT on Xarelto: Continue same as pt was weaned off Bipap  Secondary Diagnosis:	Hypothyroidism  Goal:	Clinical Followup  Assessment and plan of treatment:	continue with synthroid, follow up outpatient with PMD Principal Discharge DX:	Acute on chronic respiratory failure with hypercapnia  Goal:	Clinical Followup  Assessment and plan of treatment:	Acute hypercapnic respiratory failure due to COPD exacerbation/ pulmonary edema  Pt's bipap was malfunctioning at home--> case management helped set up with new one as well as hospital bed at home  Evaluated by ICU- not upgraded as pt improved, sent to medical floors  Pulm: BiPAP qHS and PRN during the day. nebs PRN, s/p solumedrol changed to Short Prednisone taper, O2 via NC to keep spO2 between 88-92 %  On bipap now at 35 % oxygen. Continue same (pt uses bipap at home)  s/p one dose of iv lasix. Pt on Bumex at home  f/u Echo. Strict input output and daily weight.   CE neg x1. CXR showed stable elevation right hemidiaphragm. Stable pulmonary vascularity. f/u repeat CXR  BCx 6/11: NGTD  Please follow up with Pulmonology as outpatient  Secondary Diagnosis:	Cystitis  Goal:	Clinical Recovery  Assessment and plan of treatment:	Acute Cystitis  - Urinalysis 6/11 pos  - c/w home Antibiotics  - Urine culture from 5/20 showed pansensitive P. areuginosa  Secondary Diagnosis:	DVT, lower extremity  Goal:	Clinical Followup  Assessment and plan of treatment:	h/o DVT on Xarelto: Continue same as pt was weaned off Bipap  Secondary Diagnosis:	Hypothyroidism  Goal:	Clinical Followup  Assessment and plan of treatment:	continue with synthroid, follow up outpatient with PMD Principal Discharge DX:	Acute on chronic respiratory failure with hypercapnia  Goal:	Clinical Followup  Assessment and plan of treatment:	Acute hypercapnic respiratory failure due to COPD exacerbation/ pulmonary edema  Pt's bipap was malfunctioning at home--> case management helped set up with new one as well as hospital bed at home  Evaluated by ICU- not upgraded as pt improved, sent to medical floors  Pulm: BiPAP qHS and PRN during the day. nebs PRN, s/p solumedrol changed to Short Prednisone taper, O2 via NC to keep spO2 between 88-92 %  On bipap now at 35 % oxygen. Continue same (pt uses bipap at home)  s/p one dose of iv lasix. Pt on Bumex at home  f/u Echo. Strict input output and daily weight.   CE neg x1. CXR showed stable elevation right hemidiaphragm. Stable pulmonary vascularity. f/u repeat CXR  BCx 6/11: NGTD  Please follow up with Pulmonology as outpatient  Secondary Diagnosis:	Cystitis  Goal:	Clinical Recovery  Assessment and plan of treatment:	Acute Cystitis  - Urinalysis 6/11 pos  - c/w Augmentin 500mg twice a day for 5 more days  - Urine culture from 5/20 showed pansensitive P. areuginosa  Secondary Diagnosis:	DVT, lower extremity  Goal:	Clinical Followup  Assessment and plan of treatment:	h/o DVT on Xarelto: Continue same as pt was weaned off Bipap  Secondary Diagnosis:	Hypothyroidism  Goal:	Clinical Followup  Assessment and plan of treatment:	continue with synthroid, follow up outpatient with PMD

## 2018-06-14 NOTE — DISCHARGE NOTE ADULT - PROVIDER TOKENS
TOKEN:'[id number]',FREE:[LAST:[Wale],FIRST:[Rafael],PHONE:[phone]],FAX:[(   [phone]    -],ADDRESS:[[place] [address]]]

## 2018-07-28 PROBLEM — G47.33 OBSTRUCTIVE SLEEP APNEA (ADULT) (PEDIATRIC): Chronic | Status: ACTIVE | Noted: 2018-01-01

## 2018-07-28 PROBLEM — R33.9 RETENTION OF URINE, UNSPECIFIED: Chronic | Status: ACTIVE | Noted: 2018-01-01

## 2018-07-28 PROBLEM — E03.9 HYPOTHYROIDISM, UNSPECIFIED: Chronic | Status: ACTIVE | Noted: 2018-01-01

## 2018-07-28 PROBLEM — I82.409 ACUTE EMBOLISM AND THROMBOSIS OF UNSPECIFIED DEEP VEINS OF UNSPECIFIED LOWER EXTREMITY: Chronic | Status: ACTIVE | Noted: 2018-01-01

## 2018-07-28 PROBLEM — C34.90 MALIGNANT NEOPLASM OF UNSPECIFIED PART OF UNSPECIFIED BRONCHUS OR LUNG: Chronic | Status: ACTIVE | Noted: 2018-01-01

## 2018-07-28 PROBLEM — J44.9 CHRONIC OBSTRUCTIVE PULMONARY DISEASE, UNSPECIFIED: Chronic | Status: ACTIVE | Noted: 2018-01-01

## 2018-07-28 NOTE — H&P ADULT - NSHPPHYSICALEXAM_GEN_ALL_CORE
PHYSICAL EXAM:  GENERAL: not responding to commands   HEAD:  Atraumatic, Normocephalic  EYES: pupils contricted nonreactive, negative corneal reflex  ENMT: No tonsillar erythema, exudates, or enlargement  NECK: Supple, No JVD, Normal thyroid  NERVOUS SYSTEM: not responsive to verbal or painful stimuli   CHEST/LUNG: Clear to percussion bilaterally, bibasilar rhonchi   HEART: Regular rate and rhythm; No murmurs, rubs, or gallops  ABDOMEN: Soft, Nontender, moderate distention; Bowel sounds present  EXTREMITIES:  2+ Peripheral Pulses, No clubbing, cyanosis, or edema  LYMPH: No lymphadenopathy noted  SKIN: No rashes or lesions

## 2018-07-28 NOTE — ED PROVIDER NOTE - ATTENDING CONTRIBUTION TO CARE
78 F past medical history of end-stage lung CA with DNR/DNI without a MOLST documented brought in by EMS for respiratory failure and cardiac arrest s/p ROSC and ET intubation.  Pt's  is healthcare proxy and physician, states she would not want invasive or painful procedures at this stage.  REquesting removal of ET tube.  plan is remove ET tube, place on NIV, and admit to advanced illness medical bed.

## 2018-07-28 NOTE — ED PROVIDER NOTE - MEDICAL DECISION MAKING DETAILS
accepted to advanced illness bed by Dr. Zamarripa, plan is admit to [facility] unit advanced illness.

## 2018-07-28 NOTE — H&P ADULT - ASSESSMENT
79 yo F presents s/p found unresponsive, estimated downtime of 50 minutes, was intubated on the field, CPR initiated with ROSC, extubated in ED s/p confirmation of DNR/DNI status.    s/p Cardiopulmonary Arrest with ROSC  -Admit under Advanced Illness, f/u palliative team  -Continue morphine drip  -Overall poor prognosis

## 2018-07-28 NOTE — CHART NOTE - NSCHARTNOTEFT_GEN_A_CORE
Discussed case with Dr. Hidalgo, both daughters, Natacha and Kayli, as well as  Leonidas. Family wishes to withdraw care, agreed to terminally wean off BIPAP mask. Will continue morphine drip, Ativan and morphine push PRN for comfort.

## 2018-07-28 NOTE — H&P ADULT - HISTORY OF PRESENT ILLNESS
79 yo F with PMHx of  COPD on 3.5L O2 oxygen at home, KENNY on CPAP, Lung cancer s/p RUL lobectomy (2014), hx of DVT on Xarelto BIBA s/p found unresponsive by , approximate downtime 50 minutes. Patient was recently admitted at [facility] in June for AMS secondary to acute on chronic hypercapnic respiratory failure secondary to COPD exacerbation. Patient was intubated in the field and extubated in ED s/p confirmation of DNR/DNI status via .     Patient under advanced illness care.

## 2019-06-28 NOTE — ED PROVIDER NOTE - CARE PLAN
Detail Level: Detailed Principal Discharge DX:	Chronic obstructive pulmonary disease, unspecified COPD type

## 2020-09-07 NOTE — ED PROVIDER NOTE - OBJECTIVE STATEMENT
My potassium was high because I ate a banana before they carlos my blood.
77yo F with Past Medical History COPD on 3.5L O2 oxygen at home, also on Bipap at home KENNY on CPAP, Lung cancer s/p RUL lobectomy in 2014, hx of DVT on Xarelto, Hypothyroidism, Urinary retention with chronic Garcia, and wheelchair bound at baseline was down for 50 mins before EMS arrived.   said pt was DNI and DNR but could not show papers to EMS-- so pt was intubated and CPR performed-- ROSC was obtained

## 2021-08-18 NOTE — PATIENT PROFILE ADULT. - VISION (WITH CORRECTIVE LENSES IF THE PATIENT USUALLY WEARS THEM):
Attempted care management follow up. Left message to return phone call to ambulatory care manager at [phone].    Normal vision: sees adequately in most situations; can see medication labels, newsprint